# Patient Record
Sex: MALE | Race: WHITE | NOT HISPANIC OR LATINO | Employment: PART TIME | URBAN - METROPOLITAN AREA
[De-identification: names, ages, dates, MRNs, and addresses within clinical notes are randomized per-mention and may not be internally consistent; named-entity substitution may affect disease eponyms.]

---

## 2024-11-05 ENCOUNTER — HOSPITAL ENCOUNTER (INPATIENT)
Facility: HOSPITAL | Age: 74
LOS: 8 days | Discharge: HOME | DRG: 330 | End: 2024-11-13
Attending: SURGERY | Admitting: SURGERY
Payer: MEDICARE

## 2024-11-05 ENCOUNTER — ANESTHESIA EVENT (OUTPATIENT)
Dept: SURGERY | Facility: HOSPITAL | Age: 74
End: 2024-11-05
Payer: MEDICARE

## 2024-11-05 ENCOUNTER — ANESTHESIA (OUTPATIENT)
Dept: SURGERY | Facility: HOSPITAL | Age: 74
End: 2024-11-05
Payer: MEDICARE

## 2024-11-05 DIAGNOSIS — I49.9 ARRHYTHMIA: ICD-10-CM

## 2024-11-05 DIAGNOSIS — F17.200 SMOKER: ICD-10-CM

## 2024-11-05 DIAGNOSIS — I48.91 A-FIB: ICD-10-CM

## 2024-11-05 DIAGNOSIS — D49.0 NEOPLASM OF CECUM: Primary | ICD-10-CM

## 2024-11-05 DIAGNOSIS — I34.1 MVP (MITRAL VALVE PROLAPSE): ICD-10-CM

## 2024-11-05 DIAGNOSIS — I47.10 SUPRAVENTRICULAR TACHYCARDIA: ICD-10-CM

## 2024-11-05 DIAGNOSIS — Z01.818 PRE-OP TESTING: ICD-10-CM

## 2024-11-05 DIAGNOSIS — D49.0 NEOPLASM OF CECUM: ICD-10-CM

## 2024-11-05 DIAGNOSIS — I48.91 ATRIAL FIBRILLATION STATUS POST CARDIOVERSION: ICD-10-CM

## 2024-11-05 DIAGNOSIS — I49.9 IRREGULAR HEART RATE: ICD-10-CM

## 2024-11-05 DIAGNOSIS — K56.609 SMALL BOWEL OBSTRUCTION: ICD-10-CM

## 2024-11-05 DIAGNOSIS — I47.10 SVT (SUPRAVENTRICULAR TACHYCARDIA): ICD-10-CM

## 2024-11-05 PROCEDURE — 88307 TISSUE EXAM BY PATHOLOGIST: CPT | Mod: 26,,, | Performed by: PATHOLOGY

## 2024-11-05 PROCEDURE — 88307 TISSUE EXAM BY PATHOLOGIST: CPT | Performed by: PATHOLOGY

## 2024-11-05 PROCEDURE — 36000709 HC OR TIME LEV III EA ADD 15 MIN: Performed by: SURGERY

## 2024-11-05 PROCEDURE — 44160 REMOVAL OF COLON: CPT | Mod: ,,, | Performed by: SURGERY

## 2024-11-05 PROCEDURE — 36000708 HC OR TIME LEV III 1ST 15 MIN: Performed by: SURGERY

## 2024-11-05 PROCEDURE — 63600175 PHARM REV CODE 636 W HCPCS: Performed by: SURGERY

## 2024-11-05 PROCEDURE — 94761 N-INVAS EAR/PLS OXIMETRY MLT: CPT

## 2024-11-05 PROCEDURE — 71000039 HC RECOVERY, EACH ADD'L HOUR: Performed by: SURGERY

## 2024-11-05 PROCEDURE — 71000033 HC RECOVERY, INTIAL HOUR: Performed by: SURGERY

## 2024-11-05 PROCEDURE — 25000003 PHARM REV CODE 250: Performed by: NURSE ANESTHETIST, CERTIFIED REGISTERED

## 2024-11-05 PROCEDURE — 0DTF0ZZ RESECTION OF RIGHT LARGE INTESTINE, OPEN APPROACH: ICD-10-PCS | Performed by: SURGERY

## 2024-11-05 PROCEDURE — 63600175 PHARM REV CODE 636 W HCPCS: Performed by: NURSE ANESTHETIST, CERTIFIED REGISTERED

## 2024-11-05 PROCEDURE — 25000003 PHARM REV CODE 250: Performed by: SURGERY

## 2024-11-05 PROCEDURE — 88307 TISSUE EXAM BY PATHOLOGIST: CPT | Mod: 59 | Performed by: PATHOLOGY

## 2024-11-05 PROCEDURE — 27201423 OPTIME MED/SURG SUP & DEVICES STERILE SUPPLY: Performed by: SURGERY

## 2024-11-05 PROCEDURE — 11000001 HC ACUTE MED/SURG PRIVATE ROOM

## 2024-11-05 PROCEDURE — 37000009 HC ANESTHESIA EA ADD 15 MINS: Performed by: SURGERY

## 2024-11-05 PROCEDURE — 37000008 HC ANESTHESIA 1ST 15 MINUTES: Performed by: SURGERY

## 2024-11-05 PROCEDURE — 99900035 HC TECH TIME PER 15 MIN (STAT)

## 2024-11-05 PROCEDURE — 94760 N-INVAS EAR/PLS OXIMETRY 1: CPT

## 2024-11-05 PROCEDURE — 27000221 HC OXYGEN, UP TO 24 HOURS

## 2024-11-05 RX ORDER — BUPIVACAINE HYDROCHLORIDE AND EPINEPHRINE 5; 5 MG/ML; UG/ML
INJECTION, SOLUTION EPIDURAL; INTRACAUDAL; PERINEURAL
Status: DISCONTINUED | OUTPATIENT
Start: 2024-11-05 | End: 2024-11-05 | Stop reason: HOSPADM

## 2024-11-05 RX ORDER — ONDANSETRON HYDROCHLORIDE 2 MG/ML
4 INJECTION, SOLUTION INTRAVENOUS EVERY 12 HOURS PRN
Status: DISCONTINUED | OUTPATIENT
Start: 2024-11-05 | End: 2024-11-13 | Stop reason: HOSPADM

## 2024-11-05 RX ORDER — PHENYLEPHRINE HYDROCHLORIDE 10 MG/ML
INJECTION INTRAVENOUS
Status: DISCONTINUED | OUTPATIENT
Start: 2024-11-05 | End: 2024-11-05

## 2024-11-05 RX ORDER — TRAMADOL HYDROCHLORIDE 50 MG/1
50 TABLET ORAL EVERY 4 HOURS PRN
Status: DISCONTINUED | OUTPATIENT
Start: 2024-11-05 | End: 2024-11-09

## 2024-11-05 RX ORDER — PROPOFOL 10 MG/ML
VIAL (ML) INTRAVENOUS
Status: DISCONTINUED | OUTPATIENT
Start: 2024-11-05 | End: 2024-11-05

## 2024-11-05 RX ORDER — DEXAMETHASONE SODIUM PHOSPHATE 4 MG/ML
INJECTION, SOLUTION INTRA-ARTICULAR; INTRALESIONAL; INTRAMUSCULAR; INTRAVENOUS; SOFT TISSUE
Status: DISCONTINUED | OUTPATIENT
Start: 2024-11-05 | End: 2024-11-05

## 2024-11-05 RX ORDER — ONDANSETRON HYDROCHLORIDE 2 MG/ML
INJECTION, SOLUTION INTRAMUSCULAR; INTRAVENOUS
Status: DISCONTINUED | OUTPATIENT
Start: 2024-11-05 | End: 2024-11-05

## 2024-11-05 RX ORDER — SODIUM CHLORIDE 9 MG/ML
INJECTION, SOLUTION INTRAVENOUS CONTINUOUS
Status: DISCONTINUED | OUTPATIENT
Start: 2024-11-05 | End: 2024-11-07

## 2024-11-05 RX ORDER — FENTANYL CITRATE 50 UG/ML
INJECTION, SOLUTION INTRAMUSCULAR; INTRAVENOUS
Status: DISCONTINUED | OUTPATIENT
Start: 2024-11-05 | End: 2024-11-05

## 2024-11-05 RX ORDER — ROCURONIUM BROMIDE 10 MG/ML
INJECTION, SOLUTION INTRAVENOUS
Status: DISCONTINUED | OUTPATIENT
Start: 2024-11-05 | End: 2024-11-05

## 2024-11-05 RX ORDER — EPHEDRINE SULFATE 50 MG/ML
INJECTION, SOLUTION INTRAVENOUS
Status: DISCONTINUED | OUTPATIENT
Start: 2024-11-05 | End: 2024-11-05

## 2024-11-05 RX ORDER — CEFAZOLIN 2 G/1
INJECTION, POWDER, FOR SOLUTION INTRAMUSCULAR; INTRAVENOUS
Status: DISCONTINUED | OUTPATIENT
Start: 2024-11-05 | End: 2024-11-05

## 2024-11-05 RX ORDER — SUCCINYLCHOLINE CHLORIDE 20 MG/ML
INJECTION INTRAMUSCULAR; INTRAVENOUS
Status: DISCONTINUED | OUTPATIENT
Start: 2024-11-05 | End: 2024-11-05

## 2024-11-05 RX ORDER — LIDOCAINE HYDROCHLORIDE 20 MG/ML
INJECTION, SOLUTION EPIDURAL; INFILTRATION; INTRACAUDAL; PERINEURAL
Status: DISCONTINUED | OUTPATIENT
Start: 2024-11-05 | End: 2024-11-05

## 2024-11-05 RX ORDER — ACETAMINOPHEN 10 MG/ML
INJECTION, SOLUTION INTRAVENOUS
Status: DISCONTINUED | OUTPATIENT
Start: 2024-11-05 | End: 2024-11-05

## 2024-11-05 RX ORDER — HYDROMORPHONE HYDROCHLORIDE 1 MG/ML
1 INJECTION, SOLUTION INTRAMUSCULAR; INTRAVENOUS; SUBCUTANEOUS
Status: DISCONTINUED | OUTPATIENT
Start: 2024-11-05 | End: 2024-11-09

## 2024-11-05 RX ADMIN — ACETAMINOPHEN 1000 MG: 10 INJECTION, SOLUTION INTRAVENOUS at 12:11

## 2024-11-05 RX ADMIN — PHENYLEPHRINE HYDROCHLORIDE 100 MCG: 10 INJECTION INTRAVENOUS at 01:11

## 2024-11-05 RX ADMIN — FENTANYL CITRATE 50 MCG: 0.05 INJECTION, SOLUTION INTRAMUSCULAR; INTRAVENOUS at 12:11

## 2024-11-05 RX ADMIN — LIDOCAINE HYDROCHLORIDE 60 MG: 20 INJECTION, SOLUTION EPIDURAL; INFILTRATION; INTRACAUDAL; PERINEURAL at 12:11

## 2024-11-05 RX ADMIN — HYDROMORPHONE HYDROCHLORIDE 1 MG: 1 INJECTION, SOLUTION INTRAMUSCULAR; INTRAVENOUS; SUBCUTANEOUS at 09:11

## 2024-11-05 RX ADMIN — SUCCINYLCHOLINE CHLORIDE 140 MG: 20 INJECTION, SOLUTION INTRAMUSCULAR; INTRAVENOUS at 12:11

## 2024-11-05 RX ADMIN — ONDANSETRON 4 MG: 2 INJECTION INTRAMUSCULAR; INTRAVENOUS at 01:11

## 2024-11-05 RX ADMIN — ROCURONIUM BROMIDE 30 MG: 10 INJECTION, SOLUTION INTRAVENOUS at 12:11

## 2024-11-05 RX ADMIN — SUGAMMADEX 200 MG: 100 INJECTION, SOLUTION INTRAVENOUS at 01:11

## 2024-11-05 RX ADMIN — SODIUM CHLORIDE: 0.9 INJECTION, SOLUTION INTRAVENOUS at 01:11

## 2024-11-05 RX ADMIN — EPHEDRINE SULFATE 20 MG: 50 INJECTION, SOLUTION INTRAMUSCULAR; INTRAVENOUS; SUBCUTANEOUS at 01:11

## 2024-11-05 RX ADMIN — FENTANYL CITRATE 50 MCG: 0.05 INJECTION, SOLUTION INTRAMUSCULAR; INTRAVENOUS at 01:11

## 2024-11-05 RX ADMIN — HYDROMORPHONE HYDROCHLORIDE 1 MG: 1 INJECTION, SOLUTION INTRAMUSCULAR; INTRAVENOUS; SUBCUTANEOUS at 07:11

## 2024-11-05 RX ADMIN — DEXAMETHASONE SODIUM PHOSPHATE 4 MG: 4 INJECTION, SOLUTION INTRAMUSCULAR; INTRAVENOUS at 01:11

## 2024-11-05 RX ADMIN — ROCURONIUM BROMIDE 5 MG: 10 INJECTION, SOLUTION INTRAVENOUS at 12:11

## 2024-11-05 RX ADMIN — SODIUM CHLORIDE: 0.9 INJECTION, SOLUTION INTRAVENOUS at 12:11

## 2024-11-05 RX ADMIN — GLYCOPYRROLATE 0.2 MG: 0.2 INJECTION, SOLUTION INTRAMUSCULAR; INTRAVENOUS at 12:11

## 2024-11-05 RX ADMIN — EPHEDRINE SULFATE 20 MG: 50 INJECTION, SOLUTION INTRAMUSCULAR; INTRAVENOUS; SUBCUTANEOUS at 12:11

## 2024-11-05 RX ADMIN — ROCURONIUM BROMIDE 20 MG: 10 INJECTION, SOLUTION INTRAVENOUS at 01:11

## 2024-11-05 RX ADMIN — PROPOFOL 130 MG: 10 INJECTION, EMULSION INTRAVENOUS at 12:11

## 2024-11-05 RX ADMIN — SODIUM CHLORIDE: 9 INJECTION, SOLUTION INTRAVENOUS at 03:11

## 2024-11-05 RX ADMIN — EPHEDRINE SULFATE 10 MG: 50 INJECTION, SOLUTION INTRAMUSCULAR; INTRAVENOUS; SUBCUTANEOUS at 01:11

## 2024-11-05 RX ADMIN — CEFAZOLIN 2 G: 2 INJECTION, POWDER, FOR SOLUTION INTRAMUSCULAR; INTRAVENOUS at 12:11

## 2024-11-05 RX ADMIN — HYDROMORPHONE HYDROCHLORIDE 1 MG: 1 INJECTION, SOLUTION INTRAMUSCULAR; INTRAVENOUS; SUBCUTANEOUS at 04:11

## 2024-11-05 NOTE — PROGRESS NOTES
Pt meets all criteria to be transferred to [facility]. [facility] nurse not ready for pt yet. Nurse will call when ready. Pt is resting comfortably with some mild pain. NG tube secured. Pt on 2L NC with sats in mid 90s. Dressing WNL with small spot of blood.

## 2024-11-05 NOTE — ANESTHESIA PREPROCEDURE EVALUATION
11/05/2024  Abdifatah Steele is a 74 y.o., male.    Past Medical History:   Diagnosis Date    Fractures      Past Surgical History:   Procedure Laterality Date    HAND SURGERY      SKULL FRACTURE ELEVATION         Pre-op Assessment    I have reviewed the Patient Summary Reports.     I have reviewed the Nursing Notes. I have reviewed the NPO Status.   I have reviewed the Medications.     Review of Systems  Anesthesia Hx:  No problems with previous Anesthesia                Social:  Smoker, No Alcohol Use       Hematology/Oncology:  Hematology Normal   Oncology Normal                                   EENT/Dental:  EENT/Dental Normal           Cardiovascular:  Cardiovascular Normal Exercise tolerance: good                                             Pulmonary:  Pulmonary Normal                       Renal/:  Renal/ Normal                 Hepatic/GI:  Hepatic/GI Normal                    Musculoskeletal:  Musculoskeletal Normal                Neurological:  Neurology Normal                                      Endocrine:  Endocrine Normal            Dermatological:  Skin Normal    Psych:  Psychiatric Normal                  Physical Exam  General: Well nourished    Airway:  Mallampati: II   Mouth Opening: Normal  TM Distance: Normal  Tongue: Normal  Neck ROM: Normal ROM    Chest/Lungs:  Clear to auscultation    Heart:  Rate: Normal  Rhythm: Regular Rhythm  Sounds: Normal      Anesthesia Plan  Type of Anesthesia, risks & benefits discussed:    Anesthesia Type: Gen ETT  Intra-op Monitoring Plan: Standard ASA Monitors  Post Op Pain Control Plan: multimodal analgesia  Induction:  IV  Airway Plan: Direct  Informed Consent: Informed consent signed with the Patient and all parties understand the risks and agree with anesthesia plan.  All questions answered.   ASA Score: 3  Day of Surgery Review of History &  Physical: H&P Update referred to the surgeon/provider.I have interviewed and examined the patient. I have reviewed the patient's H&P dated: 11/5/24. There are no significant changes.     Ready For Surgery From Anesthesia Perspective.     .

## 2024-11-05 NOTE — ANESTHESIA PROCEDURE NOTES
Intubation    Date/Time: 11/5/2024 12:39 PM    Performed by: Naldo Sapp CRNA  Authorized by: Naldo Sapp CRNA    Intubation:     Induction:  Rapid sequence induction    Intubated:  Postinduction    Mask Ventilation:  N/a    Attempts:  1    Attempted By:  Student    Method of Intubation:  Video laryngoscopy    Blade:  Baker 3    Laryngeal View Grade: Grade I - full view of cords      Difficult Airway Encountered?: No      Complications:  None    Airway Device:  Oral endotracheal tube    Airway Device Size:  7.5    Style/Cuff Inflation:  Cuffed (inflated to minimal occlusive pressure)    Tube secured:  22    Secured at:  The lips    Placement Verified By:  Capnometry    Complicating Factors:  None    Findings Post-Intubation:  BS equal bilateral and atraumatic/condition of teeth unchanged

## 2024-11-05 NOTE — TRANSFER OF CARE
Anesthesia Transfer of Care Note    Patient: Abdifatah Steele    Procedure(s) Performed: Procedure(s) (LRB):  LAPAROSCOPY, DIAGNOSTIC (N/A)  LAPAROTOMY, EXPLORATORY (N/A)  COLON RESECTION (Right)    Patient location: PACU    Anesthesia Type: general    Transport from OR: Transported from OR on 6-10 L/min O2 by face mask with adequate spontaneous ventilation    Post pain: adequate analgesia    Post assessment: no apparent anesthetic complications and tolerated procedure well    Post vital signs: stable    Level of consciousness: sedated    Nausea/Vomiting: no nausea/vomiting    Complications: none    Transfer of care protocol was followed      Last vitals: Visit Vitals  BP (!) 113/57   Pulse 71   Temp 36.3 °C (97.4 °F)   Resp 15   SpO2 (!) 91%

## 2024-11-05 NOTE — OP NOTE
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  All questions answered.  Patient brought to the operating room placed on a standard operating room table in supine position.  A time-out was performed.  General anesthesia was established.  Prophylactic antibiotics were administered.  His arms were tucked.  A Manning catheter was not placed.  His abdomen was clipped prepped and draped in standard surgical fashion.  Local anesthetic was injected prior to any skin incision.  A Veress needle was passed through the center of the umbilicus aspirated and flushed saline.  Saline drop test was positive.  Pneumoperitoneum was established to 15 mmHg and then a 5 mm trocar was then placed in the abdomen.  Anesthesia immediately told me to let out the pneumoperitoneum as he was becoming severely bradycardic.  Once I let out the gas they gave him some Robinul and his heart rate came back up to normal.  They told me I could try again with the pneumoperitoneum and as soon as I started inflating again he became bradycardic again.  I let out the air and his heart rate returned to normal.  At this point because he was fairly distended, I figured I would have to open anyway so we just proceeded with open laparotomy and aborted the laparoscopic approach.  I injected local under the  skin and then made a longitudinal midline laparotomy incision.  Cautery was used to get through subcutaneous tissue through the linea alba and into the abdomen.  Upon entering the abdomen I eviscerated the dilated small bowel and could immediately feel a mass.  Once all the small bowel was eviscerated I could tell that the mass was right at the ileocecal valve inside the cecum and this is what was causing his small bowel obstruction.  I felt his liver which appeared smooth and I do not feel any masses.  I do not see any evidence of distant disease or metastatic disease.  I then had anesthesia pass a nasogastric tube I could feel the tube in the stomach.  I then proceeded with open right hemicolectomy.  Patient was thin had very easy anatomy to see.  I mobilized the white line of Toldt and medialized and mobilized the cecum as well as the ascending colon and the hepatic flexure very easily.  This was done with cautery and blunt dissection.  I then picked out a spot of the terminal ileum made a window in the mesentery with cautery and then used the 75 mm MITCH stapler to staple across the terminal ileum.  I then picked a spot in the mid ascending colon near the hepatic flexure that was mobilized and used the 75 mm stapler MITCH to staple across the colon.  The only remaining structure was the mesentery and the ileocolic vessels which I took down with the hand-held Doyen device.  The main ileocolic vessels were ligated at its origin and this was doubly ligated with 0 silk suture ties.  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  There were no bulky lymph nodes felt in the mesentery.  I then proceeded to do my ileocolic anastomosis side-to-side functional end to end by stapling the common hole open with the 75 mm MITCH stapler and then the common hole was closed with a 60 TA blue load stapler.  I then milked the small bowel contents through  the anastomosis into the colon.  There was no evidence of any leakage at the anastomosis and the anastomosis was patent.  I then closed the mesenteric defect with 2-0 Vicryl suture.  Good hemostasis was obtained.  I then placed the small bowel contents back into the abdomen and covered the small bowel with the greater omentum.  The midline fascia was closed with a running 0 PDS x2.  Skin was closed with skin staples and then Aquacel Ag surgical dressing was applied over the midline incision.  The specimen will be sent to pathology.    Significant Surgical Tasks Conducted by the Assistant(s), if Applicable:     Estimated Blood Loss (EBL):  25 mL           Implants:     Specimens:  Right colon  Specimen (24h ago, onward)       Start     Ordered    11/05/24 1337  Specimen to Pathology, Surgery General Surgery  Once        Comments: Pre-op Diagnosis: Small bowel obstruction [K56.609]Procedure(s):LAPAROSCOPY, DIAGNOSTICLAPAROTOMY, EXPLORATORYCOLON RESECTION Number of specimens: 1Name of specimens: right colon anastomosisDrMillie Lopez     References:    Click here for ordering Quick Tip   Question Answer Comment   Procedure Type: General Surgery    Release to patient Immediate        11/05/24 1339    11/05/24 1331  Specimen to Pathology, Surgery General Surgery  Once        Comments: Pre-op Diagnosis: Small bowel obstruction [K56.609]Procedure(s):LAPAROSCOPY, DIAGNOSTICLAPAROTOMY, EXPLORATORYCOLON RESECTION Number of specimens: 1Name of specimens: right colonDrMillie Lopez     References:    Click here for ordering Quick Tip   Question Answer Comment   Procedure Type: General Surgery    Release to patient Immediate        11/05/24 1331                            Condition: Good    Disposition: PACU - hemodynamically stable.    Attestation: I performed the procedure.

## 2024-11-05 NOTE — NURSING
Pt here from recovery via bed by MILI Stallings. Pt AAO X 4.  No SOB or acute distress noted. Pt on 3L-N. Pt has fluids to gravity. Patient resting comfortably in bed. Fall/Safety maintained, bed low, locked, side rails up x 2, call bell within reach. APt with slip resistant socks on; remains free of fall or injury.

## 2024-11-05 NOTE — ANESTHESIA POSTPROCEDURE EVALUATION
Anesthesia Post Evaluation    Patient: Abdifatah Steele    Procedure(s) Performed: Procedure(s) (LRB):  LAPAROSCOPY, DIAGNOSTIC (N/A)  LAPAROTOMY, EXPLORATORY (N/A)  COLON RESECTION (Right)    Final Anesthesia Type: general      Patient location during evaluation: PACU  Patient participation: Yes- Able to Participate  Level of consciousness: awake and alert, oriented and awake  Post-procedure vital signs: reviewed and stable  Pain management: adequate  Airway patency: patent    PONV status at discharge: No PONV  Anesthetic complications: no      Cardiovascular status: blood pressure returned to baseline, hemodynamically stable and stable  Respiratory status: unassisted, spontaneous ventilation and room air  Hydration status: euvolemic  Follow-up not needed.              Vitals Value Taken Time   /57 11/05/24 1430   Temp 36.3 °C (97.4 °F) 11/05/24 1410   Pulse 71 11/05/24 1430   Resp 15 11/05/24 1430   SpO2 91 % 11/05/24 1430         No case tracking events are documented in the log.      Pain/Celena Score: Celena Score: 9 (11/5/2024  2:35 PM)

## 2024-11-05 NOTE — BRIEF OP NOTE
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         Specimens:  Right colon  Specimen (24h ago, onward)       Start     Ordered    11/05/24 1337  Specimen to Pathology, Surgery General Surgery  Once        Comments: Pre-op Diagnosis: Small bowel obstruction [K56.609]Procedure(s):LAPAROSCOPY, DIAGNOSTICLAPAROTOMY, EXPLORATORYCOLON RESECTION Number of specimens: 1Name of specimens: right colon anastomosisDrMillie Lopez     References:    Click here for ordering Quick Tip   Question Answer Comment   Procedure Type: General Surgery    Release to patient Immediate        11/05/24 1339    11/05/24 1331  Specimen to Pathology, Surgery General Surgery  Once        Comments: Pre-op Diagnosis: Small bowel obstruction [K56.609]Procedure(s):LAPAROSCOPY, DIAGNOSTICLAPAROTOMY, EXPLORATORYCOLON RESECTION Number of specimens: 1Name of specimens: right colonDr. Jessica     References:    Click here for ordering Quick Tip   Question Answer Comment   Procedure Type: General Surgery    Release to patient Immediate        11/05/24 1331                    [id number]

## 2024-11-06 LAB
ANION GAP SERPL CALC-SCNC: 11 MMOL/L (ref 8–16)
BUN SERPL-MCNC: 10 MG/DL (ref 8–23)
CALCIUM SERPL-MCNC: 7.9 MG/DL (ref 8.7–10.5)
CHLORIDE SERPL-SCNC: 112 MMOL/L (ref 95–110)
CO2 SERPL-SCNC: 21 MMOL/L (ref 23–29)
CREAT SERPL-MCNC: 0.8 MG/DL (ref 0.5–1.4)
ERYTHROCYTE [DISTWIDTH] IN BLOOD BY AUTOMATED COUNT: 14.3 % (ref 11.5–14.5)
EST. GFR  (NO RACE VARIABLE): >60 ML/MIN/1.73 M^2
GLUCOSE SERPL-MCNC: 90 MG/DL (ref 70–110)
HCT VFR BLD AUTO: 42 % (ref 40–54)
HGB BLD-MCNC: 13.7 G/DL (ref 14–18)
MAGNESIUM SERPL-MCNC: 1.8 MG/DL (ref 1.6–2.6)
MCH RBC QN AUTO: 32.2 PG (ref 27–31)
MCHC RBC AUTO-ENTMCNC: 32.6 G/DL (ref 32–36)
MCV RBC AUTO: 99 FL (ref 82–98)
OHS QRS DURATION: 126 MS
OHS QTC CALCULATION: 451 MS
PLATELET # BLD AUTO: 249 K/UL (ref 150–450)
PMV BLD AUTO: 9.5 FL (ref 9.2–12.9)
POTASSIUM SERPL-SCNC: 4.1 MMOL/L (ref 3.5–5.1)
RBC # BLD AUTO: 4.25 M/UL (ref 4.6–6.2)
SODIUM SERPL-SCNC: 144 MMOL/L (ref 136–145)
WBC # BLD AUTO: 10.42 K/UL (ref 3.9–12.7)

## 2024-11-06 PROCEDURE — 21400001 HC TELEMETRY ROOM

## 2024-11-06 PROCEDURE — 93010 ELECTROCARDIOGRAM REPORT: CPT | Mod: ,,, | Performed by: INTERNAL MEDICINE

## 2024-11-06 PROCEDURE — 63600175 PHARM REV CODE 636 W HCPCS: Performed by: SURGERY

## 2024-11-06 PROCEDURE — 85027 COMPLETE CBC AUTOMATED: CPT | Performed by: SURGERY

## 2024-11-06 PROCEDURE — 94799 UNLISTED PULMONARY SVC/PX: CPT

## 2024-11-06 PROCEDURE — 83735 ASSAY OF MAGNESIUM: CPT | Performed by: FAMILY MEDICINE

## 2024-11-06 PROCEDURE — 27000221 HC OXYGEN, UP TO 24 HOURS

## 2024-11-06 PROCEDURE — 25000003 PHARM REV CODE 250: Performed by: SURGERY

## 2024-11-06 PROCEDURE — 99900035 HC TECH TIME PER 15 MIN (STAT)

## 2024-11-06 PROCEDURE — 80048 BASIC METABOLIC PNL TOTAL CA: CPT | Performed by: SURGERY

## 2024-11-06 PROCEDURE — 63600175 PHARM REV CODE 636 W HCPCS: Performed by: FAMILY MEDICINE

## 2024-11-06 PROCEDURE — 94761 N-INVAS EAR/PLS OXIMETRY MLT: CPT

## 2024-11-06 PROCEDURE — 99221 1ST HOSP IP/OBS SF/LOW 40: CPT | Mod: ,,, | Performed by: FAMILY MEDICINE

## 2024-11-06 PROCEDURE — 36415 COLL VENOUS BLD VENIPUNCTURE: CPT | Performed by: FAMILY MEDICINE

## 2024-11-06 PROCEDURE — 36415 COLL VENOUS BLD VENIPUNCTURE: CPT | Performed by: SURGERY

## 2024-11-06 PROCEDURE — 93005 ELECTROCARDIOGRAM TRACING: CPT

## 2024-11-06 RX ORDER — MAGNESIUM SULFATE HEPTAHYDRATE 40 MG/ML
2 INJECTION, SOLUTION INTRAVENOUS ONCE
Status: COMPLETED | OUTPATIENT
Start: 2024-11-06 | End: 2024-11-06

## 2024-11-06 RX ORDER — DEXTROSE MONOHYDRATE, SODIUM CHLORIDE, AND POTASSIUM CHLORIDE 50; 1.49; 4.5 G/1000ML; G/1000ML; G/1000ML
INJECTION, SOLUTION INTRAVENOUS CONTINUOUS
Status: DISCONTINUED | OUTPATIENT
Start: 2024-11-06 | End: 2024-11-08

## 2024-11-06 RX ADMIN — MAGNESIUM SULFATE HEPTAHYDRATE 2 G: 40 INJECTION, SOLUTION INTRAVENOUS at 06:11

## 2024-11-06 RX ADMIN — SODIUM CHLORIDE: 9 INJECTION, SOLUTION INTRAVENOUS at 09:11

## 2024-11-06 RX ADMIN — HYDROMORPHONE HYDROCHLORIDE 1 MG: 1 INJECTION, SOLUTION INTRAMUSCULAR; INTRAVENOUS; SUBCUTANEOUS at 10:11

## 2024-11-06 RX ADMIN — HYDROMORPHONE HYDROCHLORIDE 1 MG: 1 INJECTION, SOLUTION INTRAMUSCULAR; INTRAVENOUS; SUBCUTANEOUS at 02:11

## 2024-11-06 RX ADMIN — HYDROMORPHONE HYDROCHLORIDE 1 MG: 1 INJECTION, SOLUTION INTRAMUSCULAR; INTRAVENOUS; SUBCUTANEOUS at 09:11

## 2024-11-06 RX ADMIN — HYDROMORPHONE HYDROCHLORIDE 1 MG: 1 INJECTION, SOLUTION INTRAMUSCULAR; INTRAVENOUS; SUBCUTANEOUS at 07:11

## 2024-11-06 RX ADMIN — DEXTROSE, SODIUM CHLORIDE, AND POTASSIUM CHLORIDE: 5; .45; .15 INJECTION INTRAVENOUS at 02:11

## 2024-11-06 RX ADMIN — HYDROMORPHONE HYDROCHLORIDE 1 MG: 1 INJECTION, SOLUTION INTRAMUSCULAR; INTRAVENOUS; SUBCUTANEOUS at 05:11

## 2024-11-06 RX ADMIN — HYDROMORPHONE HYDROCHLORIDE 1 MG: 1 INJECTION, SOLUTION INTRAMUSCULAR; INTRAVENOUS; SUBCUTANEOUS at 04:11

## 2024-11-06 RX ADMIN — HYDROMORPHONE HYDROCHLORIDE 1 MG: 1 INJECTION, SOLUTION INTRAMUSCULAR; INTRAVENOUS; SUBCUTANEOUS at 12:11

## 2024-11-06 RX ADMIN — HYDROMORPHONE HYDROCHLORIDE 1 MG: 1 INJECTION, SOLUTION INTRAMUSCULAR; INTRAVENOUS; SUBCUTANEOUS at 03:11

## 2024-11-06 NOTE — PLAN OF CARE
11/06/24 1013   Rounds   Attendance Provider;Nurse    Discharge Plan A Home   Why the patient remains in the hospital Requires continued medical care   Transition of Care Barriers None     Care team at bedside, discussed plan of care with patient and family.  Discharge planning initiated. Patient provided with Case Management contact information and encouraged to call with concerns or questions. Discharge Planning Begins on Admission Pamphlet provided.  Will continue to follow for duration of stay.

## 2024-11-06 NOTE — PLAN OF CARE
[place] - Med Surg (3rd Fl)  Discharge Assessment    Primary Care Provider: Mychal Valderrama MD     Discharge Assessment (most recent)       BRIEF DISCHARGE ASSESSMENT - 11/06/24 1144          Discharge Planning    Assessment Type Discharge Planning Brief Assessment (P)      Resource/Environmental Concerns none (P)      Support Systems Spouse/significant other;Family members;Friends/neighbors (P)      Assistance Needed none (P)      Equipment Currently Used at Home none (P)      Current Living Arrangements home (P)      Patient/Family Anticipates Transition to home (P)      Patient/Family Anticipated Services at Transition none (P)      DME Needed Upon Discharge  none (P)      Discharge Plan A Home (P)      Discharge Plan B Home with family (P)         Physical Activity    On average, how many days per week do you engage in moderate to strenuous exercise (like a brisk walk)? 0 days (P)      On average, how many minutes do you engage in exercise at this level? 0 min (P)         Social Isolation    How often do you feel lonely or isolated from those around you?  Never (P)         Alcohol Use    Q1: How often do you have a drink containing alcohol? Never (P)      Q2: How many drinks containing alcohol do you have on a typical day when you are drinking? Patient does not drink (P)      Q3: How often do you have six or more drinks on one occasion? Never (P)                      Discharge assessment completed at bedside with patient. No post acute needs determined at this time. CM to remain available if needs arise.

## 2024-11-06 NOTE — PLAN OF CARE
Problem: Adult Inpatient Plan of Care  Goal: Plan of Care Review  Outcome: Progressing  Flowsheets (Taken 11/6/2024 6172)  Plan of Care Reviewed With:   patient   spouse  Goal: Patient-Specific Goal (Individualized)  Outcome: Progressing  Goal: Absence of Hospital-Acquired Illness or Injury  Outcome: Progressing  Goal: Optimal Comfort and Wellbeing  Outcome: Progressing  Goal: Readiness for Transition of Care  Outcome: Progressing     Problem: Wound  Goal: Optimal Coping  Outcome: Progressing  Goal: Optimal Functional Ability  Outcome: Progressing  Goal: Absence of Infection Signs and Symptoms  Outcome: Progressing  Goal: Improved Oral Intake  Outcome: Progressing  Goal: Optimal Pain Control and Function  Outcome: Progressing  Goal: Skin Health and Integrity  Outcome: Progressing  Goal: Optimal Wound Healing  Outcome: Progressing     Problem: Fall Injury Risk  Goal: Absence of Fall and Fall-Related Injury  Outcome: Progressing     Problem: Pain Acute  Goal: Optimal Pain Control and Function  Outcome: Progressing

## 2024-11-06 NOTE — PROGRESS NOTES
SUBJECTIVE:  Pt seen and examined.  Underwent open Rt hemicolectomy yesterday and is recovering nicely.  Manning removed and is able to urinate.  Denies nausea, flatus, BM and is c/o soar throat from NGT.        OBJECTIVE:  Temp:  [97.4 °F (36.3 °C)-98.7 °F (37.1 °C)]   Pulse:  [62-86]   Resp:  [14-20]   BP: (110-145)/(57-65)   SpO2:  [91 %-97 %]       NAD, A&Ox3  RRR  CTAb  Soft, ND, approp TTP, absent BS, incision c/d/I, no SSi, NGT in place with scant output  No C/C/E, MAEW    I & O (Last 24H):  Intake/Output Summary (Last 24 hours) at 11/6/2024 1304  Last data filed at 11/6/2024 1030  Gross per 24 hour   Intake 940.72 ml   Output 425 ml   Net 515.72 ml       Lab Results   Component Value Date    WBC 10.42 11/06/2024    HGB 13.7 (L) 11/06/2024    HCT 42.0 11/06/2024    MCV 99 (H) 11/06/2024     11/06/2024     Lab Results   Component Value Date    CREATININE 0.8 11/06/2024    BUN 10 11/06/2024     11/06/2024    K 4.1 11/06/2024     (H) 11/06/2024    CO2 21 (L) 11/06/2024    CALCIUM 7.9 (L) 11/06/2024    PHOS 3.0 09/20/2012    MG 1.8 11/06/2024       ASSESSMENT/PLAN:   Abdifatah Steele is a 74 y.o. male POD 1 Rt hemicolectomy    1.  Will add chloraseptic lozenges  2. MIVF  3.  Encouraged ambulation and IS use

## 2024-11-07 PROBLEM — I47.10 SUPRAVENTRICULAR TACHYCARDIA: Status: ACTIVE | Noted: 2024-11-07

## 2024-11-07 LAB
ALBUMIN SERPL BCP-MCNC: 2.7 G/DL (ref 3.5–5.2)
ALP SERPL-CCNC: 56 U/L (ref 40–150)
ALT SERPL W/O P-5'-P-CCNC: 13 U/L (ref 10–44)
ANION GAP SERPL CALC-SCNC: 5 MMOL/L (ref 8–16)
AST SERPL-CCNC: 14 U/L (ref 10–40)
BASOPHILS # BLD AUTO: 0.01 K/UL (ref 0–0.2)
BASOPHILS NFR BLD: 0.1 % (ref 0–1.9)
BILIRUB SERPL-MCNC: 0.4 MG/DL (ref 0.1–1)
BUN SERPL-MCNC: 8 MG/DL (ref 8–23)
CALCIUM SERPL-MCNC: 8.3 MG/DL (ref 8.7–10.5)
CHLORIDE SERPL-SCNC: 106 MMOL/L (ref 95–110)
CO2 SERPL-SCNC: 28 MMOL/L (ref 23–29)
CREAT SERPL-MCNC: 0.7 MG/DL (ref 0.5–1.4)
DIFFERENTIAL METHOD BLD: ABNORMAL
EOSINOPHIL # BLD AUTO: 0 K/UL (ref 0–0.5)
EOSINOPHIL NFR BLD: 0.4 % (ref 0–8)
ERYTHROCYTE [DISTWIDTH] IN BLOOD BY AUTOMATED COUNT: 14 % (ref 11.5–14.5)
EST. GFR  (NO RACE VARIABLE): >60 ML/MIN/1.73 M^2
GLUCOSE SERPL-MCNC: 126 MG/DL (ref 70–110)
HCT VFR BLD AUTO: 41 % (ref 40–54)
HGB BLD-MCNC: 13.5 G/DL (ref 14–18)
IMM GRANULOCYTES # BLD AUTO: 0.02 K/UL (ref 0–0.04)
IMM GRANULOCYTES NFR BLD AUTO: 0.3 % (ref 0–0.5)
LYMPHOCYTES # BLD AUTO: 0.6 K/UL (ref 1–4.8)
LYMPHOCYTES NFR BLD: 8.2 % (ref 18–48)
MAGNESIUM SERPL-MCNC: 2.3 MG/DL (ref 1.6–2.6)
MCH RBC QN AUTO: 32.4 PG (ref 27–31)
MCHC RBC AUTO-ENTMCNC: 32.9 G/DL (ref 32–36)
MCV RBC AUTO: 98 FL (ref 82–98)
MONOCYTES # BLD AUTO: 0.7 K/UL (ref 0.3–1)
MONOCYTES NFR BLD: 8.9 % (ref 4–15)
NEUTROPHILS # BLD AUTO: 6.2 K/UL (ref 1.8–7.7)
NEUTROPHILS NFR BLD: 82.1 % (ref 38–73)
NRBC BLD-RTO: 0 /100 WBC
PLATELET # BLD AUTO: 242 K/UL (ref 150–450)
PMV BLD AUTO: 9.8 FL (ref 9.2–12.9)
POTASSIUM SERPL-SCNC: 3.9 MMOL/L (ref 3.5–5.1)
PROT SERPL-MCNC: 5.7 G/DL (ref 6–8.4)
RBC # BLD AUTO: 4.17 M/UL (ref 4.6–6.2)
SODIUM SERPL-SCNC: 139 MMOL/L (ref 136–145)
WBC # BLD AUTO: 7.57 K/UL (ref 3.9–12.7)

## 2024-11-07 PROCEDURE — 63600175 PHARM REV CODE 636 W HCPCS: Performed by: SURGERY

## 2024-11-07 PROCEDURE — 85025 COMPLETE CBC W/AUTO DIFF WBC: CPT | Performed by: PHYSICIAN ASSISTANT

## 2024-11-07 PROCEDURE — 27000221 HC OXYGEN, UP TO 24 HOURS

## 2024-11-07 PROCEDURE — 94761 N-INVAS EAR/PLS OXIMETRY MLT: CPT

## 2024-11-07 PROCEDURE — 83735 ASSAY OF MAGNESIUM: CPT | Performed by: PHYSICIAN ASSISTANT

## 2024-11-07 PROCEDURE — 80053 COMPREHEN METABOLIC PANEL: CPT | Performed by: PHYSICIAN ASSISTANT

## 2024-11-07 PROCEDURE — 99900035 HC TECH TIME PER 15 MIN (STAT)

## 2024-11-07 PROCEDURE — 36415 COLL VENOUS BLD VENIPUNCTURE: CPT | Performed by: PHYSICIAN ASSISTANT

## 2024-11-07 PROCEDURE — 21400001 HC TELEMETRY ROOM

## 2024-11-07 PROCEDURE — 99223 1ST HOSP IP/OBS HIGH 75: CPT | Mod: ,,, | Performed by: NURSE PRACTITIONER

## 2024-11-07 RX ORDER — ACETAMINOPHEN 10 MG/ML
1000 INJECTION, SOLUTION INTRAVENOUS EVERY 8 HOURS
Status: COMPLETED | OUTPATIENT
Start: 2024-11-07 | End: 2024-11-08

## 2024-11-07 RX ORDER — PANTOPRAZOLE SODIUM 40 MG/10ML
40 INJECTION, POWDER, LYOPHILIZED, FOR SOLUTION INTRAVENOUS DAILY
Status: DISCONTINUED | OUTPATIENT
Start: 2024-11-07 | End: 2024-11-09

## 2024-11-07 RX ORDER — ENOXAPARIN SODIUM 100 MG/ML
40 INJECTION SUBCUTANEOUS EVERY 24 HOURS
Status: DISCONTINUED | OUTPATIENT
Start: 2024-11-07 | End: 2024-11-09

## 2024-11-07 RX ORDER — IBUPROFEN 200 MG
1 TABLET ORAL DAILY PRN
Status: DISCONTINUED | OUTPATIENT
Start: 2024-11-07 | End: 2024-11-13 | Stop reason: HOSPADM

## 2024-11-07 RX ADMIN — HYDROMORPHONE HYDROCHLORIDE 1 MG: 1 INJECTION, SOLUTION INTRAMUSCULAR; INTRAVENOUS; SUBCUTANEOUS at 07:11

## 2024-11-07 RX ADMIN — HYDROMORPHONE HYDROCHLORIDE 1 MG: 1 INJECTION, SOLUTION INTRAMUSCULAR; INTRAVENOUS; SUBCUTANEOUS at 12:11

## 2024-11-07 RX ADMIN — HYDROMORPHONE HYDROCHLORIDE 1 MG: 1 INJECTION, SOLUTION INTRAMUSCULAR; INTRAVENOUS; SUBCUTANEOUS at 08:11

## 2024-11-07 RX ADMIN — ACETAMINOPHEN 1000 MG: 10 INJECTION, SOLUTION INTRAVENOUS at 01:11

## 2024-11-07 RX ADMIN — PANTOPRAZOLE SODIUM 40 MG: 40 INJECTION, POWDER, FOR SOLUTION INTRAVENOUS at 09:11

## 2024-11-07 RX ADMIN — HYDROMORPHONE HYDROCHLORIDE 1 MG: 1 INJECTION, SOLUTION INTRAMUSCULAR; INTRAVENOUS; SUBCUTANEOUS at 06:11

## 2024-11-07 RX ADMIN — HYDROMORPHONE HYDROCHLORIDE 1 MG: 1 INJECTION, SOLUTION INTRAMUSCULAR; INTRAVENOUS; SUBCUTANEOUS at 10:11

## 2024-11-07 RX ADMIN — ENOXAPARIN SODIUM 40 MG: 40 INJECTION SUBCUTANEOUS at 04:11

## 2024-11-07 RX ADMIN — HYDROMORPHONE HYDROCHLORIDE 1 MG: 1 INJECTION, SOLUTION INTRAMUSCULAR; INTRAVENOUS; SUBCUTANEOUS at 05:11

## 2024-11-07 RX ADMIN — DEXTROSE, SODIUM CHLORIDE, AND POTASSIUM CHLORIDE: 5; .45; .15 INJECTION INTRAVENOUS at 05:11

## 2024-11-07 RX ADMIN — DEXTROSE, SODIUM CHLORIDE, AND POTASSIUM CHLORIDE: 5; .45; .15 INJECTION INTRAVENOUS at 03:11

## 2024-11-07 RX ADMIN — HYDROMORPHONE HYDROCHLORIDE 1 MG: 1 INJECTION, SOLUTION INTRAMUSCULAR; INTRAVENOUS; SUBCUTANEOUS at 03:11

## 2024-11-07 RX ADMIN — ACETAMINOPHEN 1000 MG: 10 INJECTION, SOLUTION INTRAVENOUS at 11:11

## 2024-11-07 NOTE — PROGRESS NOTES
[facility] Surg (3rd Fl)  General Surgery  Progress Note    Subjective:     History of Present Illness:  Right colon mass    Post-Op Info:  Procedure(s) (LRB):  LAPAROSCOPY, DIAGNOSTIC (N/A)  LAPAROTOMY, EXPLORATORY (N/A)  COLON RESECTION (Right)   2 Days Post-Op     Interval History: Patient doing well, up out of bed, abdomen rumbling, NG tube with nothing.     Medications:  Continuous Infusions:   0.9% NaCl   Intravenous Continuous 75 mL/hr at 11/06/24 0905 New Bag at 11/06/24 0905    dextrose 5 % and 0.45 % NaCl with KCl 20 mEq   Intravenous Continuous 75 mL/hr at 11/07/24 0338 New Bag at 11/07/24 0338     Scheduled Meds:  PRN Meds:  Current Facility-Administered Medications:     HYDROmorphone, 1 mg, Intravenous, Q2H PRN    ondansetron, 4 mg, Intravenous, Q12H PRN    traMADoL, 50 mg, Oral, Q4H PRN     Review of patient's allergies indicates:  No Known Allergies  Objective:     Vital Signs (Most Recent):  Temp: 98.5 °F (36.9 °C) (11/07/24 0733)  Pulse: 74 (11/07/24 0800)  Resp: 18 (11/07/24 0834)  BP: 131/60 (11/07/24 0733)  SpO2: 96 % (11/07/24 0733) Vital Signs (24h Range):  Temp:  [97.6 °F (36.4 °C)-98.5 °F (36.9 °C)] 98.5 °F (36.9 °C)  Pulse:  [] 74  Resp:  [14-20] 18  SpO2:  [94 %-96 %] 96 %  BP: (118-133)/(57-66) 131/60     Weight: 74 kg (163 lb 2.3 oz)  Body mass index is 23.41 kg/m².    Intake/Output - Last 3 Shifts         11/05 0700 11/06 0659 11/06 0700 11/07 0659 11/07 0700 11/08 0659    P.O. 0      I.V. (mL/kg) 940.7 (12.7)      IV Piggyback 100      Total Intake(mL/kg) 1040.7 (14.1)      Urine (mL/kg/hr) 300 400 (0.2)     Drains 25      Total Output 325 400     Net +715.7 -400                     Physical Exam  Vitals and nursing note reviewed.   Constitutional:       Appearance: Normal appearance. He is well-developed.   Cardiovascular:      Rate and Rhythm: Normal rate and regular rhythm.   Pulmonary:      Effort: Pulmonary effort is normal.      Breath sounds: Normal breath sounds.    Abdominal:      General: Bowel sounds are normal. There is distension.      Palpations: Abdomen is soft.      Tenderness: There is abdominal tenderness.   Musculoskeletal:      Cervical back: Normal range of motion and neck supple.   Skin:     General: Skin is warm and dry.          Significant Labs:  I have reviewed all pertinent lab results within the past 24 hours.  CBC:   Recent Labs   Lab 11/06/24  0558   WBC 10.42   RBC 4.25*   HGB 13.7*   HCT 42.0      MCV 99*   MCH 32.2*   MCHC 32.6     BMP:   Recent Labs   Lab 11/06/24  0558   GLU 90      K 4.1   *   CO2 21*   BUN 10   CREATININE 0.8   CALCIUM 7.9*   MG 1.8       Significant Diagnostics:  I have reviewed all pertinent imaging results/findings within the past 24 hours.  Assessment/Plan:     Neoplasm of cecum  POD #2 Right colectomy  Clamp NG tube today and try clears  IVF  Pain control  PPI  IS use  MALCOLM Covington MD  General Surgery  [facility] - Med Surg (3rd Fl)

## 2024-11-07 NOTE — SUBJECTIVE & OBJECTIVE
Interval History: Patient doing well, up out of bed, abdomen rumbling, NG tube with nothing.     Medications:  Continuous Infusions:   0.9% NaCl   Intravenous Continuous 75 mL/hr at 11/06/24 0905 New Bag at 11/06/24 0905    dextrose 5 % and 0.45 % NaCl with KCl 20 mEq   Intravenous Continuous 75 mL/hr at 11/07/24 0338 New Bag at 11/07/24 0338     Scheduled Meds:  PRN Meds:  Current Facility-Administered Medications:     HYDROmorphone, 1 mg, Intravenous, Q2H PRN    ondansetron, 4 mg, Intravenous, Q12H PRN    traMADoL, 50 mg, Oral, Q4H PRN     Review of patient's allergies indicates:  No Known Allergies  Objective:     Vital Signs (Most Recent):  Temp: 98.5 °F (36.9 °C) (11/07/24 0733)  Pulse: 74 (11/07/24 0800)  Resp: 18 (11/07/24 0834)  BP: 131/60 (11/07/24 0733)  SpO2: 96 % (11/07/24 0733) Vital Signs (24h Range):  Temp:  [97.6 °F (36.4 °C)-98.5 °F (36.9 °C)] 98.5 °F (36.9 °C)  Pulse:  [] 74  Resp:  [14-20] 18  SpO2:  [94 %-96 %] 96 %  BP: (118-133)/(57-66) 131/60     Weight: 74 kg (163 lb 2.3 oz)  Body mass index is 23.41 kg/m².    Intake/Output - Last 3 Shifts         11/05 0700 11/06 0659 11/06 0700 11/07 0659 11/07 0700 11/08 0659    P.O. 0      I.V. (mL/kg) 940.7 (12.7)      IV Piggyback 100      Total Intake(mL/kg) 1040.7 (14.1)      Urine (mL/kg/hr) 300 400 (0.2)     Drains 25      Total Output 325 400     Net +715.7 -400                     Physical Exam  Vitals and nursing note reviewed.   Constitutional:       Appearance: Normal appearance. He is well-developed.   Cardiovascular:      Rate and Rhythm: Normal rate and regular rhythm.   Pulmonary:      Effort: Pulmonary effort is normal.      Breath sounds: Normal breath sounds.   Abdominal:      General: Bowel sounds are normal. There is distension.      Palpations: Abdomen is soft.      Tenderness: There is abdominal tenderness.   Musculoskeletal:      Cervical back: Normal range of motion and neck supple.   Skin:     General: Skin is warm and  dry.          Significant Labs:  I have reviewed all pertinent lab results within the past 24 hours.  CBC:   Recent Labs   Lab 11/06/24  0558   WBC 10.42   RBC 4.25*   HGB 13.7*   HCT 42.0      MCV 99*   MCH 32.2*   MCHC 32.6     BMP:   Recent Labs   Lab 11/06/24  0558   GLU 90      K 4.1   *   CO2 21*   BUN 10   CREATININE 0.8   CALCIUM 7.9*   MG 1.8       Significant Diagnostics:  I have reviewed all pertinent imaging results/findings within the past 24 hours.

## 2024-11-07 NOTE — PLAN OF CARE
Problem: Adult Inpatient Plan of Care  Goal: Plan of Care Review  Outcome: Progressing  Flowsheets (Taken 11/7/2024 6247)  Plan of Care Reviewed With:   patient   spouse  Goal: Patient-Specific Goal (Individualized)  Outcome: Progressing  Goal: Absence of Hospital-Acquired Illness or Injury  Outcome: Progressing  Goal: Optimal Comfort and Wellbeing  Outcome: Progressing  Goal: Readiness for Transition of Care  Outcome: Progressing     Problem: Wound  Goal: Optimal Coping  Outcome: Progressing  Goal: Optimal Functional Ability  Outcome: Progressing  Goal: Absence of Infection Signs and Symptoms  Outcome: Progressing  Goal: Improved Oral Intake  Outcome: Progressing  Goal: Optimal Pain Control and Function  Outcome: Progressing  Goal: Skin Health and Integrity  Outcome: Progressing  Goal: Optimal Wound Healing  Outcome: Progressing     Problem: Fall Injury Risk  Goal: Absence of Fall and Fall-Related Injury  Outcome: Progressing     Problem: Pain Acute  Goal: Optimal Pain Control and Function  Outcome: Progressing

## 2024-11-07 NOTE — SUBJECTIVE & OBJECTIVE
Past Medical History:   Diagnosis Date    Fractures        Past Surgical History:   Procedure Laterality Date    HAND SURGERY      SKULL FRACTURE ELEVATION         Review of patient's allergies indicates:  No Known Allergies    No current facility-administered medications on file prior to encounter.     Current Outpatient Medications on File Prior to Encounter   Medication Sig    carbamazepine (TEGRETOL XR) 100 MG 12 hr tablet Take 100 mg by mouth 2 (two) times daily.      CRESTOR 10 mg tablet      Family History    None       Tobacco Use    Smoking status: Some Days     Current packs/day: 0.50     Types: Cigarettes    Smokeless tobacco: Not on file   Substance and Sexual Activity    Alcohol use: No    Drug use: No    Sexual activity: Not on file     Review of Systems   Constitutional:  Negative for activity change, chills, fatigue, fever and unexpected weight change.   HENT:  Negative for sore throat and trouble swallowing.    Respiratory:  Negative for cough, chest tightness and shortness of breath.    Cardiovascular:  Negative for chest pain and leg swelling.   Gastrointestinal:  Positive for abdominal pain.        Recovering from hemicolectomy for colon cancer   Endocrine: Negative for cold intolerance and heat intolerance.   Genitourinary:  Negative for difficulty urinating.   Musculoskeletal:  Negative for back pain and joint swelling.   Skin:  Negative for rash.   Neurological:  Negative for numbness.   Hematological:  Negative for adenopathy.   Psychiatric/Behavioral:  Negative for decreased concentration.      Objective:     Vital Signs (Most Recent):  Temp: 98.4 °F (36.9 °C) (11/07/24 0421)  Pulse: 67 (11/07/24 0600)  Resp: 18 (11/07/24 0630)  BP: 131/61 (11/07/24 0421)  SpO2: 96 % (11/07/24 0421) Vital Signs (24h Range):  Temp:  [97.6 °F (36.4 °C)-98.4 °F (36.9 °C)] 98.4 °F (36.9 °C)  Pulse:  [] 67  Resp:  [14-20] 18  SpO2:  [94 %-97 %] 96 %  BP: (110-133)/(57-66) 131/61     Weight: 74 kg (163 lb  "2.3 oz)  Body mass index is 23.41 kg/m².     Physical Exam  Constitutional:       Appearance: Normal appearance. He is normal weight.      Comments: NG tube in place in the right nostril   HENT:      Head: Normocephalic and atraumatic.      Mouth/Throat:      Mouth: Mucous membranes are dry.   Eyes:      Extraocular Movements: Extraocular movements intact.      Pupils: Pupils are equal, round, and reactive to light.   Cardiovascular:      Rate and Rhythm: Normal rate and regular rhythm.      Pulses: Normal pulses.      Heart sounds: No murmur heard.     No friction rub. No gallop.   Pulmonary:      Effort: Pulmonary effort is normal.      Breath sounds: Normal breath sounds.   Abdominal:      General: Abdomen is flat.      Tenderness: There is abdominal tenderness.      Comments: Abdominal wounds bandage   Musculoskeletal:      Cervical back: Normal range of motion.      Right lower leg: No edema.      Left lower leg: No edema.   Neurological:      General: No focal deficit present.      Mental Status: He is alert.   Psychiatric:         Mood and Affect: Mood normal.          Significant Labs: All pertinent labs within the past 24 hours have been reviewed.  CBC:   Recent Labs   Lab 11/06/24  0558   WBC 10.42   HGB 13.7*   HCT 42.0        CMP:   Recent Labs   Lab 11/06/24  0558      K 4.1   *   CO2 21*   GLU 90   BUN 10   CREATININE 0.8   CALCIUM 7.9*   ANIONGAP 11     Magnesium:   Recent Labs   Lab 11/06/24  0558   MG 1.8     TSH: No results for input(s): "TSH" in the last 4320 hours.    Significant Imaging: I have reviewed all pertinent imaging results/findings within the past 24 hours.  I have reviewed and interpreted all pertinent imaging results/findings within the past 24 hours.  "

## 2024-11-07 NOTE — HOSPITAL COURSE
Medicine consulted earlier this admission for arrythmia. Given Mg and had no further episodes.  Medicine team called again evening of 11/9/24 for new onset a-fib RVR. HR 120s on average. EKG confirming a-fib. Labs OK and no acute electrolytes abnormalities. TSH normal.  Cardiology had evalauted patient prior. TTE with diastolic dysfunction but no valve abnormalities and EF normal. Their plan was for stress testing outpatient.    11/9/24 patient treated with IV metoprolol 5mg x2 doses and PO metoprolol 50mg PO. His HR did improved to 70-80s and remains normal throughout the evening though still in a-fib. Transitioned to treatment dose lovenox.    Today, patient will remain on telemetry on PO metoprolol. Will ask CM to assist with Eliquis pricing for outpatient use. If HR is controlled can discharge later today or tomorrow AM on metoprolol 50mg PO BID and Eliquis 5mg BID. Happy to write these two medication on discharge for surgery team if needed.  He will need close PCP and cardiology follow up later this week.     11/11 PT HD stable on room air.  Went into afib with RVR.  Received IV metoprolol.  Current HR 90's.  On lovenox and metoprolol.  Waiting for cardiology recommendations.    Replace potassium.      11/12: no acute events overnight. HR stable. Patient was made NPO for possible GIFTY cardioversion with cardiology today. Dispo pending cardiology recs. K 3.4-will replace

## 2024-11-07 NOTE — HOSPITAL COURSE
11/7/2024: Patient is POD #2 from right colectomy. Nothing out of NG tube, abdomen rumbling a bit today. Will try clears.   11/13/2024: Patient is doing well, eating, bowel movements, cleared from medicine and cards.

## 2024-11-07 NOTE — HPI
Medicine was consulted by surgery to see this 74-year-old white male status post hemicolectomy he has been having runs of V-tach.  These are all asymptomatic.  Patient denies chest pain, shortness of breath.  He still has an NG tube in place.

## 2024-11-07 NOTE — NURSING
Pt had 30 beat run of vtach. Dr. Covington and Dr. Sandra notified. Mg 2g IV x1 dose and consult [facility] cardiology per Dr. Sandra.

## 2024-11-07 NOTE — CONSULTS
[facility] ([facility])  [facility] Medicine  Consult Note    Patient Name: Abdifatah Steele  MRN: [id number]  Admission Date: 11/5/2024  Hospital Length of Stay: 2 days  Attending Physician: Connor Lopez Jr., *   Primary Care Provider: Mychal Valderrama MD           Patient information was obtained from patient, spouse/SO, and ER records.     Consults  Subjective:     Principal Problem: Small bowel obstruction    Chief Complaint: No chief complaint on file.       HPI: I was consulted by surgery to see this 74-year-old white male status post hemicolectomy he has been having runs of V-tach.  These are all asymptomatic.  Patient denies chest pain, shortness of breath.  He still has an NG tube in place.    Past Medical History:   Diagnosis Date    Fractures        Past Surgical History:   Procedure Laterality Date    HAND SURGERY      SKULL FRACTURE ELEVATION         Review of patient's allergies indicates:  No Known Allergies    No current facility-administered medications on file prior to encounter.     Current Outpatient Medications on File Prior to Encounter   Medication Sig    carbamazepine (TEGRETOL XR) 100 MG 12 hr tablet Take 100 mg by mouth 2 (two) times daily.      CRESTOR 10 mg tablet      Family History    None       Tobacco Use    Smoking status: Some Days     Current packs/day: 0.50     Types: Cigarettes    Smokeless tobacco: Not on file   Substance and Sexual Activity    Alcohol use: No    Drug use: No    Sexual activity: Not on file     Review of Systems   Constitutional:  Negative for activity change, chills, fatigue, fever and unexpected weight change.   HENT:  Negative for sore throat and trouble swallowing.    Respiratory:  Negative for cough, chest tightness and shortness of breath.    Cardiovascular:  Negative for chest pain and leg swelling.   Gastrointestinal:  Positive for abdominal pain.        Recovering from hemicolectomy for colon cancer   Endocrine: Negative for cold intolerance and heat  intolerance.   Genitourinary:  Negative for difficulty urinating.   Musculoskeletal:  Negative for back pain and joint swelling.   Skin:  Negative for rash.   Neurological:  Negative for numbness.   Hematological:  Negative for adenopathy.   Psychiatric/Behavioral:  Negative for decreased concentration.      Objective:     Vital Signs (Most Recent):  Temp: 98.4 °F (36.9 °C) (11/07/24 0421)  Pulse: 67 (11/07/24 0600)  Resp: 18 (11/07/24 0630)  BP: 131/61 (11/07/24 0421)  SpO2: 96 % (11/07/24 0421) Vital Signs (24h Range):  Temp:  [97.6 °F (36.4 °C)-98.4 °F (36.9 °C)] 98.4 °F (36.9 °C)  Pulse:  [] 67  Resp:  [14-20] 18  SpO2:  [94 %-97 %] 96 %  BP: (110-133)/(57-66) 131/61     Weight: 74 kg (163 lb 2.3 oz)  Body mass index is 23.41 kg/m².     Physical Exam  Constitutional:       Appearance: Normal appearance. He is normal weight.      Comments: NG tube in place in the right nostril   HENT:      Head: Normocephalic and atraumatic.      Mouth/Throat:      Mouth: Mucous membranes are dry.   Eyes:      Extraocular Movements: Extraocular movements intact.      Pupils: Pupils are equal, round, and reactive to light.   Cardiovascular:      Rate and Rhythm: Normal rate and regular rhythm.      Pulses: Normal pulses.      Heart sounds: No murmur heard.     No friction rub. No gallop.   Pulmonary:      Effort: Pulmonary effort is normal.      Breath sounds: Normal breath sounds.   Abdominal:      General: Abdomen is flat.      Tenderness: There is abdominal tenderness.      Comments: Abdominal wounds bandage   Musculoskeletal:      Cervical back: Normal range of motion.      Right lower leg: No edema.      Left lower leg: No edema.   Neurological:      General: No focal deficit present.      Mental Status: He is alert.   Psychiatric:         Mood and Affect: Mood normal.          Significant Labs: All pertinent labs within the past 24 hours have been reviewed.  CBC:   Recent Labs   Lab 11/06/24  0558   WBC 10.42   HGB  "13.7*   HCT 42.0        CMP:   Recent Labs   Lab 11/06/24  0558      K 4.1   *   CO2 21*   GLU 90   BUN 10   CREATININE 0.8   CALCIUM 7.9*   ANIONGAP 11     Magnesium:   Recent Labs   Lab 11/06/24  0558   MG 1.8     TSH: No results for input(s): "TSH" in the last 4320 hours.    Significant Imaging: I have reviewed all pertinent imaging results/findings within the past 24 hours.  I have reviewed and interpreted all pertinent imaging results/findings within the past 24 hours.  Assessment/Plan:     Supraventricular tachycardia  Patient has had several runs of ventricular tachycardia.  His magnesium level was 1.8 so I gave him 2 g of Mag and he has not had any runs of V-tach since.  Continue to monitor on telemetry.    Cardiology consulted        VTE Risk Mitigation (From admission, onward)           Ordered     IP VTE HIGH RISK PATIENT  Once         11/05/24 1413     Place sequential compression device  Until discontinued         11/05/24 1413                        Thank you for your consult. I will follow-up with patient. Please contact us if you have any additional questions.    Boaz Marsh MD  Department of Hospital Medicine   [place] - Med Surg (3rd Fl)      "

## 2024-11-07 NOTE — ASSESSMENT & PLAN NOTE
Patient has had several runs of ventricular tachycardia.  His magnesium level was 1.8 so I gave him 2 g of Mag and he has not had any runs of V-tach since.  Continue to monitor on telemetry.    Cardiology consulted

## 2024-11-08 PROBLEM — F17.200 SMOKER: Status: ACTIVE | Noted: 2024-11-08

## 2024-11-08 PROBLEM — I34.1 MVP (MITRAL VALVE PROLAPSE): Status: ACTIVE | Noted: 2024-11-08

## 2024-11-08 LAB
AV INDEX (PROSTH): 0.73
AV MEAN GRADIENT: 2.4 MMHG
AV PEAK GRADIENT: 5.8 MMHG
AV VALVE AREA BY VELOCITY RATIO: 2.1 CM²
AV VALVE AREA: 2.3 CM²
AV VELOCITY RATIO: 0.67
BSA FOR ECHO PROCEDURE: 1.91 M2
CV ECHO LV RWT: 0.34 CM
DOP CALC AO PEAK VEL: 1.2 M/S
DOP CALC AO VTI: 21 CM
DOP CALC LVOT AREA: 3.1 CM2
DOP CALC LVOT DIAMETER: 2 CM
DOP CALC LVOT PEAK VEL: 0.8 M/S
DOP CALC LVOT STROKE VOLUME: 48 CM3
DOP CALCLVOT PEAK VEL VTI: 15.3 CM
E WAVE DECELERATION TIME: 247.82 MSEC
E/A RATIO: 0.65
E/E' RATIO: 6.75 M/S
ECHO LV POSTERIOR WALL: 0.8 CM (ref 0.6–1.1)
FINAL PATHOLOGIC DIAGNOSIS: ABNORMAL
FINAL PATHOLOGIC DIAGNOSIS: NORMAL
FRACTIONAL SHORTENING: 42.6 % (ref 28–44)
GROSS: ABNORMAL
GROSS: NORMAL
INTERVENTRICULAR SEPTUM: 0.9 CM (ref 0.6–1.1)
IVRT: 79.92 MSEC
LA MAJOR: 4.68 CM
LEFT ATRIUM AREA SYSTOLIC (APICAL 2 CHAMBER): 14.09 CM2
LEFT ATRIUM AREA SYSTOLIC (APICAL 4 CHAMBER): 11.55 CM2
LEFT ATRIUM SIZE: 3.01 CM
LEFT ATRIUM VOLUME INDEX MOD: 15.4 ML/M2
LEFT ATRIUM VOLUME MOD: 29.41 ML
LEFT INTERNAL DIMENSION IN SYSTOLE: 2.7 CM (ref 2.1–4)
LEFT VENTRICLE DIASTOLIC VOLUME INDEX: 53.36 ML/M2
LEFT VENTRICLE DIASTOLIC VOLUME: 101.92 ML
LEFT VENTRICLE END SYSTOLIC VOLUME APICAL 2 CHAMBER: 37.12 ML
LEFT VENTRICLE END SYSTOLIC VOLUME APICAL 4 CHAMBER: 21.26 ML
LEFT VENTRICLE MASS INDEX: 69.3 G/M2
LEFT VENTRICLE SYSTOLIC VOLUME INDEX: 14.5 ML/M2
LEFT VENTRICLE SYSTOLIC VOLUME: 27.77 ML
LEFT VENTRICULAR INTERNAL DIMENSION IN DIASTOLE: 4.7 CM (ref 3.5–6)
LEFT VENTRICULAR MASS: 132.3 G
LV LATERAL E/E' RATIO: 6.75 M/S
LV SEPTAL E/E' RATIO: 6.75 M/S
LVED V (TEICH): 101.92 ML
LVES V (TEICH): 27.77 ML
LVOT MG: 1.03 MMHG
LVOT MV: 0.47 CM/S
Lab: ABNORMAL
Lab: NORMAL
MV PEAK A VEL: 0.83 M/S
MV PEAK E VEL: 0.54 M/S
MV STENOSIS PRESSURE HALF TIME: 60.82 MS
MV VALVE AREA P 1/2 METHOD: 3.62 CM2
PISA TR MAX VEL: 2.99 M/S
PULM VEIN S/D RATIO: 0.81
PV MV: 0.46 M/S
PV PEAK D VEL: 0.36 M/S
PV PEAK GRADIENT: 2 MMHG
PV PEAK S VEL: 0.29 M/S
PV PEAK VELOCITY: 0.75 M/S
RA MAJOR: 5.06 CM
RA PRESSURE ESTIMATED: 3 MMHG
RIGHT VENTRICULAR END-DIASTOLIC DIMENSION: 2.25 CM
RV TB RVSP: 6 MMHG
RV TISSUE DOPPLER FREE WALL SYSTOLIC VELOCITY 1 (APICAL 4 CHAMBER VIEW): 10.03 CM/S
TDI LATERAL: 0.08 M/S
TDI SEPTAL: 0.08 M/S
TDI: 0.08 M/S
TR MAX PG: 36 MMHG
TRICUSPID ANNULAR PLANE SYSTOLIC EXCURSION: 2.54 CM
TV REST PULMONARY ARTERY PRESSURE: 39 MMHG
Z-SCORE OF LEFT VENTRICULAR DIMENSION IN END DIASTOLE: -1.34
Z-SCORE OF LEFT VENTRICULAR DIMENSION IN END SYSTOLE: -1.61

## 2024-11-08 PROCEDURE — 21400001 HC TELEMETRY ROOM

## 2024-11-08 PROCEDURE — 63600175 PHARM REV CODE 636 W HCPCS: Performed by: SURGERY

## 2024-11-08 PROCEDURE — 94761 N-INVAS EAR/PLS OXIMETRY MLT: CPT

## 2024-11-08 PROCEDURE — 99900035 HC TECH TIME PER 15 MIN (STAT)

## 2024-11-08 PROCEDURE — 27000221 HC OXYGEN, UP TO 24 HOURS

## 2024-11-08 PROCEDURE — 99900031 HC PATIENT EDUCATION (STAT)

## 2024-11-08 PROCEDURE — 99024 POSTOP FOLLOW-UP VISIT: CPT | Mod: ,,, | Performed by: SURGERY

## 2024-11-08 PROCEDURE — 25000003 PHARM REV CODE 250: Performed by: SURGERY

## 2024-11-08 RX ORDER — HYDROCODONE BITARTRATE AND ACETAMINOPHEN 5; 325 MG/1; MG/1
1 TABLET ORAL EVERY 6 HOURS PRN
Qty: 20 TABLET | Refills: 0 | Status: SHIPPED | OUTPATIENT
Start: 2024-11-08

## 2024-11-08 RX ADMIN — DEXTROSE, SODIUM CHLORIDE, AND POTASSIUM CHLORIDE: 5; .45; .15 INJECTION INTRAVENOUS at 07:11

## 2024-11-08 RX ADMIN — ENOXAPARIN SODIUM 40 MG: 40 INJECTION SUBCUTANEOUS at 04:11

## 2024-11-08 RX ADMIN — ACETAMINOPHEN 1000 MG: 10 INJECTION, SOLUTION INTRAVENOUS at 06:11

## 2024-11-08 RX ADMIN — HYDROMORPHONE HYDROCHLORIDE 1 MG: 1 INJECTION, SOLUTION INTRAMUSCULAR; INTRAVENOUS; SUBCUTANEOUS at 07:11

## 2024-11-08 RX ADMIN — TRAMADOL HYDROCHLORIDE 50 MG: 50 TABLET, COATED ORAL at 11:11

## 2024-11-08 RX ADMIN — PANTOPRAZOLE SODIUM 40 MG: 40 INJECTION, POWDER, FOR SOLUTION INTRAVENOUS at 10:11

## 2024-11-08 RX ADMIN — HYDROMORPHONE HYDROCHLORIDE 1 MG: 1 INJECTION, SOLUTION INTRAMUSCULAR; INTRAVENOUS; SUBCUTANEOUS at 02:11

## 2024-11-08 RX ADMIN — HYDROMORPHONE HYDROCHLORIDE 1 MG: 1 INJECTION, SOLUTION INTRAMUSCULAR; INTRAVENOUS; SUBCUTANEOUS at 04:11

## 2024-11-08 NOTE — CONSULTS
[place] - [facility] Surg ([facility])  Cardiology  Consult Note    Patient Name: Abdifatah Steele  MRN: [id number]  Admission Date: 11/5/2024  Hospital Length of Stay: 3 days  Code Status: No Order   Attending Provider: Connor Lopez Jr., *   Consulting Provider: Sindi Cristina NP  Primary Care Physician: Mychal Valderrama MD  Principal Problem:Small bowel obstruction    Patient information was obtained from patient and ER records.     Inpatient consult to Cardiology-[facility]  Consult performed by: Sindi Cristina NP  Consult ordered by: Boaz Marsh MD        Subjective:     Chief Complaint:  SVT    HPI:   74-year-old with past medical history of SVT, MPV, heavy smoker presents with severe bradycardia during small-bowel obstruction procedure.  Heart rate was noted to be in the 30s during the procedure.  He remains inpatient in is now experiencing periods of SVT, longest 30 beat run.  He was asymptomatic and watching TV during this occurrence.  EKG right bundle branch block, nonischemic.  He does not see Cardiology but reports a history of mitral valve prolapse and not following up with Cardiology.  He reports HUYNH but denies chest pain, palpitations, swelling to lower extremities, orthopnea.    Past Medical History:   Diagnosis Date    Fractures        Past Surgical History:   Procedure Laterality Date    HAND SURGERY      SKULL FRACTURE ELEVATION         Review of patient's allergies indicates:  No Known Allergies    No current facility-administered medications on file prior to encounter.     Current Outpatient Medications on File Prior to Encounter   Medication Sig    carbamazepine (TEGRETOL XR) 100 MG 12 hr tablet Take 100 mg by mouth 2 (two) times daily.      CRESTOR 10 mg tablet      Family History    None       Tobacco Use    Smoking status: Some Days     Current packs/day: 0.50     Types: Cigarettes    Smokeless tobacco: Not on file   Substance and Sexual Activity    Alcohol use: No    Drug use: No    Sexual  activity: Not on file     Review of Systems   Constitutional: Negative.   Cardiovascular:  Positive for dyspnea on exertion. Negative for chest pain, claudication, cyanosis, irregular heartbeat, leg swelling, near-syncope, orthopnea, palpitations, paroxysmal nocturnal dyspnea and syncope.   Respiratory:  Positive for cough.    Gastrointestinal:         Abdominal surgery incision     Objective:     Vital Signs (Most Recent):  Temp: 98.1 °F (36.7 °C) (11/08/24 1151)  Pulse: 78 (11/08/24 1200)  Resp: 20 (11/08/24 1151)  BP: 136/63 (11/08/24 1151)  SpO2: (!) 93 % (11/08/24 1151) Vital Signs (24h Range):  Temp:  [97.9 °F (36.6 °C)-99.2 °F (37.3 °C)] 98.1 °F (36.7 °C)  Pulse:  [68-89] 78  Resp:  [16-20] 20  SpO2:  [93 %-96 %] 93 %  BP: (125-145)/(63-72) 136/63     Weight: 74 kg (163 lb 2.3 oz)  Body mass index is 23.41 kg/m².    SpO2: (!) 93 %         Intake/Output Summary (Last 24 hours) at 11/8/2024 1310  Last data filed at 11/8/2024 0816  Gross per 24 hour   Intake 222 ml   Output 100 ml   Net 122 ml       Lines/Drains/Airways       Drain  Duration                  NG/OG Tube 11/05/24 1800 Left nostril 2 days              Peripheral Intravenous Line  Duration                  Peripheral IV - Single Lumen 11/05/24 1118 22 G 1 in No Left;Posterior Forearm 3 days                     Physical Exam  Cardiovascular:      Rate and Rhythm: Normal rate. Rhythm irregular.      Pulses: Normal pulses.      Heart sounds: Murmur heard.   Pulmonary:      Effort: Pulmonary effort is normal.      Breath sounds: Rhonchi present.   Musculoskeletal:         General: Normal range of motion.      Right lower leg: No edema.      Left lower leg: No edema.   Skin:     General: Skin is warm.   Neurological:      Mental Status: He is alert and oriented to person, place, and time.   Psychiatric:         Mood and Affect: Mood normal.          Significant Labs: CMP   Recent Labs   Lab 11/07/24  0827      K 3.9      CO2 28   *  "  BUN 8   CREATININE 0.7   CALCIUM 8.3*   PROT 5.7*   ALBUMIN 2.7*   BILITOT 0.4   ALKPHOS 56   AST 14   ALT 13   ANIONGAP 5*   , CBC   Recent Labs   Lab 11/07/24  0827   WBC 7.57   HGB 13.5*   HCT 41.0      , Lipid Panel No results for input(s): "CHOL", "HDL", "LDLCALC", "TRIG", "CHOLHDL" in the last 48 hours., and Troponin No results for input(s): "TROPONINI" in the last 48 hours.    Significant Imaging: Echocardiogram: Transthoracic echo (TTE) complete ([facility] Only):   Results for orders placed or performed during the hospital encounter of 11/05/24   Echo   Result Value Ref Range    BSA 1.91 m2    LVOT stroke volume 48.0 cm3    LVIDd 4.7 3.5 - 6.0 cm    LV Systolic Volume 27.77 mL    LV Systolic Volume Index 14.5 mL/m2    LVIDs 2.7 2.1 - 4.0 cm    LV ESV A2C 37.12 mL    LV Diastolic Volume 101.92 mL    LV ESV A4C 21.26 mL    LV Diastolic Volume Index 53.36 mL/m2    Left Ventricular End Systolic Volume by Teichholz Method 27.77 mL    Left Ventricular End Diastolic Volume by Teichholz Method 101.92 mL    IVS 0.9 0.6 - 1.1 cm    LVOT diameter 2.0 cm    LVOT area 3.1 cm2    FS 42.6 28 - 44 %    Left Ventricle Relative Wall Thickness 0.34 cm    PW 0.8 0.6 - 1.1 cm    LV mass 132.3 g    LV Mass Index 69.3 g/m2    MV Peak E Pollo 0.54 m/s    TDI LATERAL 0.08 m/s    TDI SEPTAL 0.08 m/s    E/E' ratio 6.75 m/s    MV Peak A Pollo 0.83 m/s    TR Max Pollo 2.99 m/s    E/A ratio 0.65     IVRT 79.92 msec    E wave deceleration time 247.82 msec    LV SEPTAL E/E' RATIO 6.75 m/s    LV LATERAL E/E' RATIO 6.75 m/s    PV Peak S Pollo 0.29 m/s    PV Peak D Pollo 0.36 m/s    Pulm vein S/D ratio 0.81     LVOT peak pollo 0.8 m/s    Left Ventricular Outflow Tract Mean Velocity 0.47 cm/s    Left Ventricular Outflow Tract Mean Gradient 1.03 mmHg    RV S' 10.03 cm/s    TAPSE 2.54 cm    LA size 3.01 cm    Left Atrium Major Axis 4.68 cm    LA Vol (MOD) 29.41 mL    COREY (MOD) 15.4 mL/m2    RA Major Axis 5.06 cm    AV mean gradient 2.4 mmHg    AV peak " gradient 5.8 mmHg    Ao peak drea 1.2 m/s    Ao VTI 21.0 cm    LVOT peak VTI 15.3 cm    AV valve area 2.3 cm²    AV Velocity Ratio 0.67     AV index (prosthetic) 0.73     FADI by Velocity Ratio 2.1 cm²    MV stenosis pressure 1/2 time 60.82 ms    MV valve area p 1/2 method 3.62 cm2    Triscuspid Valve Regurgitation Peak Gradient 36 mmHg    PV PEAK VELOCITY 0.75 m/s    PV peak gradient 2 mmHg    Pulmonary Valve Mean Velocity 0.46 m/s    Mean e' 0.08 m/s    ZLVIDS -1.61     ZLVIDD -1.34     LA area A4C 11.55 cm2    LA area A2C 14.09 cm2    RVDD 2.25 cm     Assessment and Plan:     Supraventricular tachycardia  30 beat run per tele monitor; received magnesium  Asymptomatic  Colon surgery 11/5/2024 and severe bradycardia during procedure, heart rate 30s  No further reoccurrence    Check echocardiogram to assess LV function and valvular structure  Case discussed with Dr. Linda  We will attempt to get Lexiscan in a.m. if not, outpatient  Continue to monitor on telemetry        VTE Risk Mitigation (From admission, onward)           Ordered     enoxaparin injection 40 mg  Every 24 hours         11/07/24 0839     IP VTE HIGH RISK PATIENT  Once         11/05/24 1413     Place sequential compression device  Until discontinued         11/05/24 1413                    Thank you for your consult. I will follow-up with patient. Please contact us if you have any additional questions.    Sindi Cristina NP  Cardiology   [place] - Med Surg (3rd Fl)

## 2024-11-08 NOTE — PLAN OF CARE
Problem: Adult Inpatient Plan of Care  Goal: Plan of Care Review  Outcome: Progressing  Goal: Patient-Specific Goal (Individualized)  Outcome: Progressing  Goal: Absence of Hospital-Acquired Illness or Injury  Outcome: Progressing  Goal: Optimal Comfort and Wellbeing  Outcome: Progressing  Goal: Readiness for Transition of Care  Outcome: Progressing     Problem: Wound  Goal: Optimal Coping  Outcome: Progressing  Goal: Optimal Functional Ability  Outcome: Progressing  Goal: Absence of Infection Signs and Symptoms  Outcome: Progressing  Goal: Improved Oral Intake  Outcome: Progressing  Goal: Optimal Pain Control and Function  Outcome: Progressing  Goal: Skin Health and Integrity  Outcome: Progressing  Goal: Optimal Wound Healing  Outcome: Progressing     Problem: Fall Injury Risk  Goal: Absence of Fall and Fall-Related Injury  Outcome: Progressing     Problem: Pain Acute  Goal: Optimal Pain Control and Function  Outcome: Progressing     Problem: Skin Injury Risk Increased  Goal: Skin Health and Integrity  Outcome: Progressing

## 2024-11-08 NOTE — ASSESSMENT & PLAN NOTE
30 beat run per tele monitor; received magnesium  Asymptomatic  Colon surgery 11/5/2024 and severe bradycardia during procedure, heart rate 30s  No further reoccurrence    Check echocardiogram to assess LV function and valvular structure  Case discussed with Dr. Linda  We will attempt to get Lexiscan in a.m. if not, outpatient  Continue to monitor on telemetry

## 2024-11-08 NOTE — SUBJECTIVE & OBJECTIVE
Past Medical History:   Diagnosis Date    Fractures        Past Surgical History:   Procedure Laterality Date    HAND SURGERY      SKULL FRACTURE ELEVATION         Review of patient's allergies indicates:  No Known Allergies    No current facility-administered medications on file prior to encounter.     Current Outpatient Medications on File Prior to Encounter   Medication Sig    carbamazepine (TEGRETOL XR) 100 MG 12 hr tablet Take 100 mg by mouth 2 (two) times daily.      CRESTOR 10 mg tablet      Family History    None       Tobacco Use    Smoking status: Some Days     Current packs/day: 0.50     Types: Cigarettes    Smokeless tobacco: Not on file   Substance and Sexual Activity    Alcohol use: No    Drug use: No    Sexual activity: Not on file     Review of Systems   Constitutional: Negative.   Cardiovascular:  Positive for dyspnea on exertion. Negative for chest pain, claudication, cyanosis, irregular heartbeat, leg swelling, near-syncope, orthopnea, palpitations, paroxysmal nocturnal dyspnea and syncope.   Respiratory:  Positive for cough.    Gastrointestinal:         Abdominal surgery incision     Objective:     Vital Signs (Most Recent):  Temp: 98.1 °F (36.7 °C) (11/08/24 1151)  Pulse: 78 (11/08/24 1200)  Resp: 20 (11/08/24 1151)  BP: 136/63 (11/08/24 1151)  SpO2: (!) 93 % (11/08/24 1151) Vital Signs (24h Range):  Temp:  [97.9 °F (36.6 °C)-99.2 °F (37.3 °C)] 98.1 °F (36.7 °C)  Pulse:  [68-89] 78  Resp:  [16-20] 20  SpO2:  [93 %-96 %] 93 %  BP: (125-145)/(63-72) 136/63     Weight: 74 kg (163 lb 2.3 oz)  Body mass index is 23.41 kg/m².    SpO2: (!) 93 %         Intake/Output Summary (Last 24 hours) at 11/8/2024 1310  Last data filed at 11/8/2024 0816  Gross per 24 hour   Intake 222 ml   Output 100 ml   Net 122 ml       Lines/Drains/Airways       Drain  Duration                  NG/OG Tube 11/05/24 1800 Left nostril 2 days              Peripheral Intravenous Line  Duration                  Peripheral IV - Single  "Lumen 11/05/24 1118 22 G 1 in No Left;Posterior Forearm 3 days                     Physical Exam  Cardiovascular:      Rate and Rhythm: Normal rate. Rhythm irregular.      Pulses: Normal pulses.      Heart sounds: Murmur heard.   Pulmonary:      Effort: Pulmonary effort is normal.      Breath sounds: Rhonchi present.   Musculoskeletal:         General: Normal range of motion.      Right lower leg: No edema.      Left lower leg: No edema.   Skin:     General: Skin is warm.   Neurological:      Mental Status: He is alert and oriented to person, place, and time.   Psychiatric:         Mood and Affect: Mood normal.          Significant Labs: CMP   Recent Labs   Lab 11/07/24  0827      K 3.9      CO2 28   *   BUN 8   CREATININE 0.7   CALCIUM 8.3*   PROT 5.7*   ALBUMIN 2.7*   BILITOT 0.4   ALKPHOS 56   AST 14   ALT 13   ANIONGAP 5*   , CBC   Recent Labs   Lab 11/07/24  0827   WBC 7.57   HGB 13.5*   HCT 41.0      , Lipid Panel No results for input(s): "CHOL", "HDL", "LDLCALC", "TRIG", "CHOLHDL" in the last 48 hours., and Troponin No results for input(s): "TROPONINI" in the last 48 hours.    Significant Imaging: Echocardiogram: Transthoracic echo (TTE) complete ([facility] Only):   Results for orders placed or performed during the hospital encounter of 11/05/24   Echo   Result Value Ref Range    BSA 1.91 m2    LVOT stroke volume 48.0 cm3    LVIDd 4.7 3.5 - 6.0 cm    LV Systolic Volume 27.77 mL    LV Systolic Volume Index 14.5 mL/m2    LVIDs 2.7 2.1 - 4.0 cm    LV ESV A2C 37.12 mL    LV Diastolic Volume 101.92 mL    LV ESV A4C 21.26 mL    LV Diastolic Volume Index 53.36 mL/m2    Left Ventricular End Systolic Volume by Teichholz Method 27.77 mL    Left Ventricular End Diastolic Volume by Teichholz Method 101.92 mL    IVS 0.9 0.6 - 1.1 cm    LVOT diameter 2.0 cm    LVOT area 3.1 cm2    FS 42.6 28 - 44 %    Left Ventricle Relative Wall Thickness 0.34 cm    PW 0.8 0.6 - 1.1 cm    LV mass 132.3 g    LV Mass " Index 69.3 g/m2    MV Peak E Pollo 0.54 m/s    TDI LATERAL 0.08 m/s    TDI SEPTAL 0.08 m/s    E/E' ratio 6.75 m/s    MV Peak A Pollo 0.83 m/s    TR Max Pollo 2.99 m/s    E/A ratio 0.65     IVRT 79.92 msec    E wave deceleration time 247.82 msec    LV SEPTAL E/E' RATIO 6.75 m/s    LV LATERAL E/E' RATIO 6.75 m/s    PV Peak S Pollo 0.29 m/s    PV Peak D Pollo 0.36 m/s    Pulm vein S/D ratio 0.81     LVOT peak pollo 0.8 m/s    Left Ventricular Outflow Tract Mean Velocity 0.47 cm/s    Left Ventricular Outflow Tract Mean Gradient 1.03 mmHg    RV S' 10.03 cm/s    TAPSE 2.54 cm    LA size 3.01 cm    Left Atrium Major Axis 4.68 cm    LA Vol (MOD) 29.41 mL    COREY (MOD) 15.4 mL/m2    RA Major Axis 5.06 cm    AV mean gradient 2.4 mmHg    AV peak gradient 5.8 mmHg    Ao peak pollo 1.2 m/s    Ao VTI 21.0 cm    LVOT peak VTI 15.3 cm    AV valve area 2.3 cm²    AV Velocity Ratio 0.67     AV index (prosthetic) 0.73     FADI by Velocity Ratio 2.1 cm²    MV stenosis pressure 1/2 time 60.82 ms    MV valve area p 1/2 method 3.62 cm2    Triscuspid Valve Regurgitation Peak Gradient 36 mmHg    PV PEAK VELOCITY 0.75 m/s    PV peak gradient 2 mmHg    Pulmonary Valve Mean Velocity 0.46 m/s    Mean e' 0.08 m/s    ZLVIDS -1.61     ZLVIDD -1.34     LA area A4C 11.55 cm2    LA area A2C 14.09 cm2    RVDD 2.25 cm

## 2024-11-08 NOTE — HPI
74-year-old with past medical history of SVT, currently post hemicolectomy. [facility] consulted for new onset atrial fibrillation with RVR.

## 2024-11-09 LAB
ALBUMIN SERPL BCP-MCNC: 2.6 G/DL (ref 3.5–5.2)
ALP SERPL-CCNC: 60 U/L (ref 40–150)
ALT SERPL W/O P-5'-P-CCNC: 10 U/L (ref 10–44)
ANION GAP SERPL CALC-SCNC: 11 MMOL/L (ref 8–16)
AST SERPL-CCNC: 11 U/L (ref 10–40)
BASOPHILS # BLD AUTO: 0.02 K/UL (ref 0–0.2)
BASOPHILS NFR BLD: 0.3 % (ref 0–1.9)
BILIRUB SERPL-MCNC: 0.4 MG/DL (ref 0.1–1)
BUN SERPL-MCNC: 7 MG/DL (ref 8–23)
CALCIUM SERPL-MCNC: 8.4 MG/DL (ref 8.7–10.5)
CHLORIDE SERPL-SCNC: 106 MMOL/L (ref 95–110)
CO2 SERPL-SCNC: 25 MMOL/L (ref 23–29)
CREAT SERPL-MCNC: 0.6 MG/DL (ref 0.5–1.4)
DIFFERENTIAL METHOD BLD: ABNORMAL
EOSINOPHIL # BLD AUTO: 0.2 K/UL (ref 0–0.5)
EOSINOPHIL NFR BLD: 3.7 % (ref 0–8)
ERYTHROCYTE [DISTWIDTH] IN BLOOD BY AUTOMATED COUNT: 13.4 % (ref 11.5–14.5)
EST. GFR  (NO RACE VARIABLE): >60 ML/MIN/1.73 M^2
GLUCOSE SERPL-MCNC: 106 MG/DL (ref 70–110)
HCT VFR BLD AUTO: 41 % (ref 40–54)
HGB BLD-MCNC: 14.1 G/DL (ref 14–18)
IMM GRANULOCYTES # BLD AUTO: 0.02 K/UL (ref 0–0.04)
IMM GRANULOCYTES NFR BLD AUTO: 0.3 % (ref 0–0.5)
LYMPHOCYTES # BLD AUTO: 0.8 K/UL (ref 1–4.8)
LYMPHOCYTES NFR BLD: 12.8 % (ref 18–48)
MAGNESIUM SERPL-MCNC: 2.1 MG/DL (ref 1.6–2.6)
MCH RBC QN AUTO: 32.3 PG (ref 27–31)
MCHC RBC AUTO-ENTMCNC: 34.4 G/DL (ref 32–36)
MCV RBC AUTO: 94 FL (ref 82–98)
MONOCYTES # BLD AUTO: 0.5 K/UL (ref 0.3–1)
MONOCYTES NFR BLD: 7.5 % (ref 4–15)
NEUTROPHILS # BLD AUTO: 4.9 K/UL (ref 1.8–7.7)
NEUTROPHILS NFR BLD: 75.4 % (ref 38–73)
NRBC BLD-RTO: 0 /100 WBC
PLATELET # BLD AUTO: 258 K/UL (ref 150–450)
PMV BLD AUTO: 9.6 FL (ref 9.2–12.9)
POTASSIUM SERPL-SCNC: 3.6 MMOL/L (ref 3.5–5.1)
PROT SERPL-MCNC: 6 G/DL (ref 6–8.4)
RBC # BLD AUTO: 4.36 M/UL (ref 4.6–6.2)
SODIUM SERPL-SCNC: 142 MMOL/L (ref 136–145)
TSH SERPL DL<=0.005 MIU/L-ACNC: 1.32 UIU/ML (ref 0.4–4)
WBC # BLD AUTO: 6.55 K/UL (ref 3.9–12.7)

## 2024-11-09 PROCEDURE — 25000003 PHARM REV CODE 250: Performed by: SURGERY

## 2024-11-09 PROCEDURE — 93005 ELECTROCARDIOGRAM TRACING: CPT

## 2024-11-09 PROCEDURE — 25000003 PHARM REV CODE 250

## 2024-11-09 PROCEDURE — 83735 ASSAY OF MAGNESIUM: CPT | Performed by: SURGERY

## 2024-11-09 PROCEDURE — 36415 COLL VENOUS BLD VENIPUNCTURE: CPT | Performed by: SURGERY

## 2024-11-09 PROCEDURE — 99900035 HC TECH TIME PER 15 MIN (STAT)

## 2024-11-09 PROCEDURE — 94761 N-INVAS EAR/PLS OXIMETRY MLT: CPT

## 2024-11-09 PROCEDURE — 63600175 PHARM REV CODE 636 W HCPCS: Performed by: SURGERY

## 2024-11-09 PROCEDURE — 84443 ASSAY THYROID STIM HORMONE: CPT | Performed by: INTERNAL MEDICINE

## 2024-11-09 PROCEDURE — 21400001 HC TELEMETRY ROOM

## 2024-11-09 PROCEDURE — 94799 UNLISTED PULMONARY SVC/PX: CPT

## 2024-11-09 PROCEDURE — 85025 COMPLETE CBC W/AUTO DIFF WBC: CPT | Performed by: SURGERY

## 2024-11-09 PROCEDURE — 93010 ELECTROCARDIOGRAM REPORT: CPT | Mod: ,,, | Performed by: INTERNAL MEDICINE

## 2024-11-09 PROCEDURE — 25000003 PHARM REV CODE 250: Performed by: INTERNAL MEDICINE

## 2024-11-09 PROCEDURE — 80053 COMPREHEN METABOLIC PANEL: CPT | Performed by: SURGERY

## 2024-11-09 RX ORDER — DOCUSATE SODIUM 100 MG/1
100 CAPSULE, LIQUID FILLED ORAL 2 TIMES DAILY
Status: DISCONTINUED | OUTPATIENT
Start: 2024-11-09 | End: 2024-11-13 | Stop reason: HOSPADM

## 2024-11-09 RX ORDER — ADHESIVE BANDAGE
30 BANDAGE TOPICAL ONCE
Status: COMPLETED | OUTPATIENT
Start: 2024-11-09 | End: 2024-11-09

## 2024-11-09 RX ORDER — PANTOPRAZOLE SODIUM 40 MG/1
40 TABLET, DELAYED RELEASE ORAL DAILY
Status: DISCONTINUED | OUTPATIENT
Start: 2024-11-10 | End: 2024-11-13 | Stop reason: HOSPADM

## 2024-11-09 RX ORDER — METOPROLOL TARTRATE 1 MG/ML
5 INJECTION, SOLUTION INTRAVENOUS EVERY 5 MIN PRN
Status: COMPLETED | OUTPATIENT
Start: 2024-11-09 | End: 2024-11-11

## 2024-11-09 RX ORDER — METOPROLOL TARTRATE 1 MG/ML
INJECTION, SOLUTION INTRAVENOUS
Status: COMPLETED
Start: 2024-11-09 | End: 2024-11-09

## 2024-11-09 RX ORDER — HYDROCODONE BITARTRATE AND ACETAMINOPHEN 7.5; 325 MG/1; MG/1
1 TABLET ORAL EVERY 6 HOURS PRN
Status: DISCONTINUED | OUTPATIENT
Start: 2024-11-09 | End: 2024-11-13 | Stop reason: HOSPADM

## 2024-11-09 RX ORDER — ENOXAPARIN SODIUM 100 MG/ML
1 INJECTION SUBCUTANEOUS EVERY 12 HOURS
Status: DISCONTINUED | OUTPATIENT
Start: 2024-11-10 | End: 2024-11-13

## 2024-11-09 RX ORDER — METOPROLOL TARTRATE 50 MG/1
50 TABLET ORAL 2 TIMES DAILY
Status: DISCONTINUED | OUTPATIENT
Start: 2024-11-09 | End: 2024-11-13 | Stop reason: HOSPADM

## 2024-11-09 RX ORDER — TRAMADOL HYDROCHLORIDE 50 MG/1
50 TABLET ORAL EVERY 4 HOURS PRN
Status: DISCONTINUED | OUTPATIENT
Start: 2024-11-09 | End: 2024-11-13 | Stop reason: HOSPADM

## 2024-11-09 RX ADMIN — ENOXAPARIN SODIUM 40 MG: 40 INJECTION SUBCUTANEOUS at 05:11

## 2024-11-09 RX ADMIN — PANTOPRAZOLE SODIUM 40 MG: 40 INJECTION, POWDER, FOR SOLUTION INTRAVENOUS at 08:11

## 2024-11-09 RX ADMIN — DOCUSATE SODIUM 100 MG: 100 CAPSULE, LIQUID FILLED ORAL at 08:11

## 2024-11-09 RX ADMIN — DOCUSATE SODIUM 100 MG: 100 CAPSULE, LIQUID FILLED ORAL at 10:11

## 2024-11-09 RX ADMIN — TRAMADOL HYDROCHLORIDE 50 MG: 50 TABLET, COATED ORAL at 03:11

## 2024-11-09 RX ADMIN — METOROPROLOL TARTRATE 5 MG: 5 INJECTION, SOLUTION INTRAVENOUS at 07:11

## 2024-11-09 RX ADMIN — HYDROCODONE BITARTRATE AND ACETAMINOPHEN 1 TABLET: 7.5; 325 TABLET ORAL at 11:11

## 2024-11-09 RX ADMIN — HYDROCODONE BITARTRATE AND ACETAMINOPHEN 1 TABLET: 7.5; 325 TABLET ORAL at 05:11

## 2024-11-09 RX ADMIN — METOPROLOL TARTRATE 50 MG: 50 TABLET, FILM COATED ORAL at 07:11

## 2024-11-09 RX ADMIN — METOROPROLOL TARTRATE 5 MG: 5 INJECTION, SOLUTION INTRAVENOUS at 09:11

## 2024-11-09 RX ADMIN — MAGNESIUM HYDROXIDE 2400 MG: 400 SUSPENSION ORAL at 10:11

## 2024-11-09 RX ADMIN — HYDROMORPHONE HYDROCHLORIDE 1 MG: 1 INJECTION, SOLUTION INTRAMUSCULAR; INTRAVENOUS; SUBCUTANEOUS at 06:11

## 2024-11-09 NOTE — PLAN OF CARE
Problem: Adult Inpatient Plan of Care  Goal: Plan of Care Review  Outcome: Progressing     Problem: Wound  Goal: Optimal Functional Ability  Outcome: Progressing     Problem: Wound  Goal: Skin Health and Integrity  Outcome: Progressing     Problem: Wound  Goal: Optimal Wound Healing  Outcome: Progressing     Problem: Fall Injury Risk  Goal: Absence of Fall and Fall-Related Injury  Outcome: Progressing     Problem: Pain Acute  Goal: Optimal Pain Control and Function  Outcome: Progressing     Problem: Skin Injury Risk Increased  Goal: Skin Health and Integrity  Outcome: Progressing

## 2024-11-09 NOTE — NURSING
Received call from Dr. Morse, discussed discharge orders. Patient report given of patient with No BM since before sx date 11/5/24. Discussed patient concern of not going home before having a BM. Patient and wife reports Dr. Lopez said he would not be going home till he has a BM. Discussed medication for stool softener/constipation prn.  Reviewed prn pain medication and pain scale. Informed surgeon I education patient on ambulation and post op care. Dr. Morse reports patient will be not be discharged today.

## 2024-11-09 NOTE — PLAN OF CARE
Problem: Adult Inpatient Plan of Care  Goal: Plan of Care Review  Outcome: Progressing  Goal: Patient-Specific Goal (Individualized)  Outcome: Progressing  Goal: Absence of Hospital-Acquired Illness or Injury  Outcome: Progressing  Goal: Optimal Comfort and Wellbeing  Outcome: Progressing  Goal: Readiness for Transition of Care  Outcome: Progressing     Problem: Wound  Goal: Optimal Coping  Outcome: Progressing  Goal: Optimal Functional Ability  Outcome: Progressing  Goal: Absence of Infection Signs and Symptoms  Outcome: Progressing  Goal: Improved Oral Intake  Outcome: Progressing  Goal: Optimal Pain Control and Function  Outcome: Progressing  Goal: Skin Health and Integrity  Outcome: Progressing  Goal: Optimal Wound Healing  Outcome: Progressing     Problem: Fall Injury Risk  Goal: Absence of Fall and Fall-Related Injury  Outcome: Progressing     Problem: Pain Acute  Goal: Optimal Pain Control and Function  Outcome: Progressing     Problem: Skin Injury Risk Increased  Goal: Skin Health and Integrity  Outcome: Progressing     Problem: Gas Exchange Impaired  Goal: Optimal Gas Exchange  Outcome: Progressing

## 2024-11-09 NOTE — PROGRESS NOTES
[facility] ([facility])  General Surgery  Progress Note    Subjective:     Interval History:   Patient NG tube removed earlier today.  Patient on diet.  Passing flatus.  Denies abdominal pain.  Denies nausea vomiting.  Denies fever chills.  No bowel movement  Post-Op Info:  Procedure(s) (LRB):  LAPAROSCOPY, DIAGNOSTIC (ATTEMPTED) (N/A)  LAPAROTOMY, EXPLORATORY (N/A)  COLON RESECTION (Right)   3 Days Post-Op      Medications:  Continuous Infusions:   dextrose 5 % and 0.45 % NaCl with KCl 20 mEq   Intravenous Continuous 75 mL/hr at 11/08/24 1613 Rate Verify at 11/08/24 1613     Scheduled Meds:   enoxparin  40 mg Subcutaneous Q24H (prophylaxis, 1700)    pantoprazole  40 mg Intravenous Daily     PRN Meds:  Current Facility-Administered Medications:     HYDROmorphone, 1 mg, Intravenous, Q2H PRN    nicotine, 1 patch, Transdermal, Daily PRN    ondansetron, 4 mg, Intravenous, Q12H PRN    traMADoL, 50 mg, Oral, Q4H PRN     Objective:     Vital Signs (Most Recent):  Temp: 98.3 °F (36.8 °C) (11/08/24 1533)  Pulse: 75 (11/08/24 1800)  Resp: 20 (11/08/24 1533)  BP: (!) 148/70 (11/08/24 1533)  SpO2: (!) 94 % (11/08/24 1533) Vital Signs (24h Range):  Temp:  [97.9 °F (36.6 °C)-98.3 °F (36.8 °C)] 98.3 °F (36.8 °C)  Pulse:  [68-78] 75  Resp:  [16-20] 20  SpO2:  [93 %-96 %] 94 %  BP: (125-148)/(63-72) 148/70       Intake/Output Summary (Last 24 hours) at 11/8/2024 1804  Last data filed at 11/8/2024 1754  Gross per 24 hour   Intake 5314.4 ml   Output --   Net 5314.4 ml       Physical Exam  Patient is sitting up in bed.  Patient appears comfortable in no distress    Abdomen distended.  Appropriate tenderness.  No peritonitis.  Dressing intact with minimal drainage.  Significant Labs:  All pertinent labs from the last 24 hours have been reviewed.    Significant Diagnostics:  None    Assessment/Plan:     Active Diagnoses:    Diagnosis Date Noted POA    PRINCIPAL PROBLEM:  Small bowel obstruction [K56.609] 11/05/2024 Yes    MVP  (mitral valve prolapse) [I34.1] 11/08/2024 Unknown    Smoker [F17.200] 11/08/2024 Unknown    Supraventricular tachycardia [I47.10] 11/07/2024 No    Neoplasm of cecum [D49.0] 11/05/2024 Yes      Problems Resolved During this Admission:   Patient is status post colon resection for obstructing mass.  Patient doing well.  Diet as tolerated.  Encourage ambulation.  Probable discharge tomorrow.      Ramon Morse MD  General Surgery  [place] - [facility] (3rd Fl)

## 2024-11-10 PROCEDURE — 94761 N-INVAS EAR/PLS OXIMETRY MLT: CPT

## 2024-11-10 PROCEDURE — 25000003 PHARM REV CODE 250: Performed by: INTERNAL MEDICINE

## 2024-11-10 PROCEDURE — 25000003 PHARM REV CODE 250: Performed by: SURGERY

## 2024-11-10 PROCEDURE — 99232 SBSQ HOSP IP/OBS MODERATE 35: CPT | Mod: ,,, | Performed by: INTERNAL MEDICINE

## 2024-11-10 PROCEDURE — 63600175 PHARM REV CODE 636 W HCPCS: Performed by: INTERNAL MEDICINE

## 2024-11-10 PROCEDURE — 99900035 HC TECH TIME PER 15 MIN (STAT)

## 2024-11-10 PROCEDURE — 99900031 HC PATIENT EDUCATION (STAT)

## 2024-11-10 PROCEDURE — 21400001 HC TELEMETRY ROOM

## 2024-11-10 PROCEDURE — 94799 UNLISTED PULMONARY SVC/PX: CPT

## 2024-11-10 RX ADMIN — METOPROLOL TARTRATE 50 MG: 50 TABLET, FILM COATED ORAL at 09:11

## 2024-11-10 RX ADMIN — ENOXAPARIN SODIUM 70 MG: 80 INJECTION SUBCUTANEOUS at 09:11

## 2024-11-10 RX ADMIN — ENOXAPARIN SODIUM 70 MG: 80 INJECTION SUBCUTANEOUS at 08:11

## 2024-11-10 RX ADMIN — PANTOPRAZOLE SODIUM 40 MG: 40 TABLET, DELAYED RELEASE ORAL at 09:11

## 2024-11-10 RX ADMIN — METOPROLOL TARTRATE 50 MG: 50 TABLET, FILM COATED ORAL at 08:11

## 2024-11-10 NOTE — NURSING
HR up to 127, pt observed to be in the bathroom, having a bm. Metoprolol 5 mg, IVP administered HR currently in the 90's. Denies any pain, resting on the bed calm, LAYNE @ this time.

## 2024-11-10 NOTE — NURSING
Dr. Fermin notified of patients HR elevated at 158-180 BPM. Patient reports having a large BM. Orders noted for EKG, CBC, CMP and MAG. Notified by MILI Belcher.

## 2024-11-10 NOTE — PLAN OF CARE
Problem: Adult Inpatient Plan of Care  Goal: Plan of Care Review  11/10/2024 0521 by Ana Maria Santo RN  Outcome: Progressing  11/10/2024 0519 by Ana Maria Santo RN  Outcome: Progressing     Problem: Adult Inpatient Plan of Care  Goal: Patient-Specific Goal (Individualized)  11/10/2024 0521 by Ana Maria Santo RN  Outcome: Progressing  11/10/2024 0519 by Ana Maria Santo RN  Outcome: Progressing     Problem: Adult Inpatient Plan of Care  Goal: Optimal Comfort and Wellbeing  11/10/2024 0521 by Ana Maria Santo RN  Outcome: Progressing  11/10/2024 0519 by Ana Maria Santo RN  Outcome: Progressing     Problem: Wound  Goal: Optimal Coping  Outcome: Progressing     Problem: Wound  Goal: Improved Oral Intake  Outcome: Progressing     Problem: Wound  Goal: Optimal Pain Control and Function  Outcome: Progressing     Plan of care reviewed and followed. Patient progressing well.Converted  to  new AFIB, RVR, a total of metoprolol  10 mg, IVP and 25 mg, po administered during the night, remains in AFIB with controlled HR in the 80's and 90's. Observed to be resting comfortably on the bed, denies any pain, spouse remains @ the bedside. Tolerated medications well. Absence of falls or injury noted throughout shift. Instructed to call for mobility assistance. Call bell in reach, side rails up x2, bed in lowest position. VSS and NADN. Compliant with both scheduled and prn medications. POC ongoing.

## 2024-11-10 NOTE — CONSULTS
[facility] ([address] [place] [address] Medicine  Consult Note    Patient Name: Abdifatah Steele  MRN: [id number]  Admission Date: 11/5/2024  Hospital Length of Stay: 5 days  Attending Physician: Connor Lopez Jr., *   Primary Care Provider: Mychal Valderrama MD           Patient information was obtained from patient and ER records.     Inpatient consult to Internal Medicine  Consult performed by: Stacy Fermin MD  Consult ordered by: Nixon Covington MD        Subjective:     Principal Problem: Small bowel obstruction    Chief Complaint: No chief complaint on file.       HPI: Medicine was consulted by surgery to see this 74-year-old white male status post hemicolectomy he has been having runs of V-tach.  These are all asymptomatic.  Patient denies chest pain, shortness of breath.  He still has an NG tube in place.    Past Medical History:   Diagnosis Date    Fractures        Past Surgical History:   Procedure Laterality Date    COLON RESECTION Right 11/5/2024    Procedure: COLON RESECTION;  Surgeon: Connor Lopez Jr., MD;  Location: [facility] OR;  Service: General;  Laterality: Right;    DIAGNOSTIC LAPAROSCOPY N/A 11/5/2024    Procedure: LAPAROSCOPY, DIAGNOSTIC (ATTEMPTED);  Surgeon: Connor Lopez Jr., MD;  Location: [facility] OR;  Service: General;  Laterality: N/A;    HAND SURGERY      LAPAROTOMY, EXPLORATORY N/A 11/5/2024    Procedure: LAPAROTOMY, EXPLORATORY;  Surgeon: Connor Lopez Jr., MD;  Location: [facility] OR;  Service: General;  Laterality: N/A;    SKULL FRACTURE ELEVATION         Review of patient's allergies indicates:  No Known Allergies    No current facility-administered medications on file prior to encounter.     Current Outpatient Medications on File Prior to Encounter   Medication Sig    carbamazepine (TEGRETOL XR) 100 MG 12 hr tablet Take 100 mg by mouth 2 (two) times daily.      CRESTOR 10 mg tablet      Family History    None       Tobacco Use    Smoking status: Some Days     Current packs/day: 0.50      Types: Cigarettes    Smokeless tobacco: Not on file   Substance and Sexual Activity    Alcohol use: No    Drug use: No    Sexual activity: Not on file     Review of Systems   Constitutional:  Negative for fatigue and fever.   HENT:  Negative for congestion, ear pain, hearing loss, rhinorrhea and sore throat.    Eyes:  Negative for redness and visual disturbance.   Respiratory:  Negative for cough, shortness of breath and wheezing.    Cardiovascular:  Negative for chest pain, palpitations and leg swelling.   Gastrointestinal:  Negative for constipation, diarrhea, nausea and vomiting.   Musculoskeletal:  Negative for back pain, joint swelling and neck pain.   Neurological:  Negative for dizziness, light-headedness and headaches.     Objective:     Vital Signs (Most Recent):  Temp: 97.9 °F (36.6 °C) (11/10/24 0758)  Pulse: 86 (11/10/24 0800)  Resp: 19 (11/10/24 0758)  BP: 127/64 (11/10/24 0758)  SpO2: (!) 94 % (11/10/24 0758) Vital Signs (24h Range):  Temp:  [97.9 °F (36.6 °C)-98.5 °F (36.9 °C)] 97.9 °F (36.6 °C)  Pulse:  [] 86  Resp:  [17-20] 19  SpO2:  [90 %-94 %] 94 %  BP: (111-154)/(64-79) 127/64     Weight: 74 kg (163 lb 2.3 oz)  Body mass index is 23.41 kg/m².     Physical Exam  Vitals and nursing note reviewed.   Constitutional:       General: He is not in acute distress.     Appearance: He is well-developed.   HENT:      Head: Normocephalic and atraumatic.      Right Ear: External ear normal.      Left Ear: External ear normal.   Eyes:      General:         Right eye: No discharge.         Left eye: No discharge.   Neck:      Thyroid: No thyromegaly.   Cardiovascular:      Rate and Rhythm: Normal rate. Rhythm irregular.      Heart sounds: No murmur heard.  Pulmonary:      Effort: Pulmonary effort is normal. No respiratory distress.      Breath sounds: Normal breath sounds.   Abdominal:      General: There is no distension.      Tenderness: There is no abdominal tenderness.   Skin:     General: Skin is  warm and dry.   Neurological:      Mental Status: He is alert and oriented to person, place, and time.   Psychiatric:         Behavior: Behavior normal.         Thought Content: Thought content normal.          Significant Labs: All pertinent labs within the past 24 hours have been reviewed.  CBC:   Recent Labs   Lab 11/09/24 1923   WBC 6.55   HGB 14.1   HCT 41.0        CMP:   Recent Labs   Lab 11/09/24 1923      K 3.6      CO2 25      BUN 7*   CREATININE 0.6   CALCIUM 8.4*   PROT 6.0   ALBUMIN 2.6*   BILITOT 0.4   ALKPHOS 60   AST 11   ALT 10   ANIONGAP 11     Recent Lab Results         11/09/24 1923        Albumin 2.6       ALP 60       ALT 10       Anion Gap 11       AST 11       Baso # 0.02       Basophil % 0.3       BILIRUBIN TOTAL 0.4  Comment: For infants and newborns, interpretation of results should be based  on gestational age, weight and in agreement with clinical  observations.    Premature Infant recommended reference ranges:  Up to 24 hours.............<8.0 mg/dL  Up to 48 hours............<12.0 mg/dL  3-5 days..................<15.0 mg/dL  6-29 days.................<15.0 mg/dL         BUN 7       Calcium 8.4       Chloride 106       CO2 25       Creatinine 0.6       Differential Method Automated       eGFR >60       Eos # 0.2       Eos % 3.7       Glucose 106       Gran # (ANC) 4.9       Gran % 75.4       Hematocrit 41.0       Hemoglobin 14.1       Immature Grans (Abs) 0.02  Comment: Mild elevation in immature granulocytes is non specific and   can be seen in a variety of conditions including stress response,   acute inflammation, trauma and pregnancy. Correlation with other   laboratory and clinical findings is essential.         Immature Granulocytes 0.3       Lymph # 0.8       Lymph % 12.8       Magnesium  2.1       MCH 32.3       MCHC 34.4       MCV 94       Mono # 0.5       Mono % 7.5       MPV 9.6       nRBC 0       Platelet Count 258       Potassium 3.6        PROTEIN TOTAL 6.0       RBC 4.36       RDW 13.4       Sodium 142       TSH 1.325       WBC 6.55               Significant Imaging: I have reviewed all pertinent imaging results/findings within the past 24 hours.  Assessment/Plan:     * Small bowel obstruction  Management per surgery team      Smoker  Smoking cessation recommended      Supraventricular tachycardia  Patient has had several runs of ventricular tachycardia.  His magnesium level was 1.8 so I gave him 2 g of Mag and he has not had any runs of V-tach since.  Continue to monitor on telemetry.    Cardiology consulted    11/10: Medicine reconsulted overnight. Patient had no further SVT on telemetry since initial consult  until last night. A-fib RVR noted with HR sustaining 120s. Treated with IV metoprolol and now on PO metoprolol  HR 70-80s  Remains in a-fib  Treatment dose lovenox started  Will plan to transition to PO Eliquis. CM to assist with pricing to ensure able to obtain at discharge.    If HR remains stable, patient could discharge with metoprolol and eliquis and see cardiology later this week for repeat EKG and discuss antiarrythmic therapy. If remains inpatient overnight cardiology to reassess in AM.   TTE and labs are normal      Neoplasm of cecum  Management per surgery team        VTE Risk Mitigation (From admission, onward)           Ordered     enoxaparin injection 70 mg  Every 12 hours         11/09/24 1934     IP VTE HIGH RISK PATIENT  Once         11/05/24 1413     Place sequential compression device  Until discontinued         11/05/24 1413                        Thank you for your consult. I will follow-up with patient. Please contact us if you have any additional questions.    Stacy Fermin MD  Department of Hospital Medicine   [facility] ([facility])

## 2024-11-10 NOTE — NURSING
HR elevated to the 120' unsustained whenever pt ambulates to the bathroom. Pt is getting agitated whenever nurse goes to the room to check on him whenever HR is observed elevated on tele monitor.

## 2024-11-10 NOTE — NURSING
Dr. Morse here for bedside rounds. ML surgery dressing removed.Staples intact. No S/S of infection.

## 2024-11-10 NOTE — PROGRESS NOTES
Medicine was consulted on this patient 11/7/2024. Patient noted to have SVT. Mg replaced. Arrhythmia resolved. Patient planned for discharge today but developed afib RVR while ambulating to bathroom. EKG done. Labs ordered and pending. TTE 11/8/24 with diastolic dysfunction otherwise normal.     Will give metoprolol for HR control. Note h/o bradycardia during surgery but none since.   Replace lytes PRN  Continue telemetry  Lovenox given

## 2024-11-10 NOTE — ASSESSMENT & PLAN NOTE
Patient has had several runs of ventricular tachycardia.  His magnesium level was 1.8 so I gave him 2 g of Mag and he has not had any runs of V-tach since.  Continue to monitor on telemetry.    Cardiology consulted    11/10: Medicine reconsulted overnight. Patient had no further SVT on telemetry since initial consult  until last night. A-fib RVR noted with HR sustaining 120s. Treated with IV metoprolol and now on PO metoprolol  HR 70-80s  Remains in a-fib  Treatment dose lovenox started  Will plan to transition to PO Eliquis. CM to assist with pricing to ensure able to obtain at discharge.    If HR remains stable, patient could discharge with metoprolol and eliquis and see cardiology later this week for repeat EKG and discuss antiarrythmic therapy. If remains inpatient overnight cardiology to reassess in AM.   TTE and labs are normal

## 2024-11-10 NOTE — PROGRESS NOTES
[facility] (3rd Fl)  General Surgery  Progress Note    Subjective:     Interval History:   Yesterday events discussed with nursing staff.  Patient with AFib with rapid ventricular response.  Patient started on metoprolol and Lovenox transitioning to Eliquis.  Patient denies nausea vomiting.  Patient denies abdominal pain.  Patient has had several bowel movements.  Post-Op Info:  Procedure(s) (LRB):  LAPAROSCOPY, DIAGNOSTIC (ATTEMPTED) (N/A)  LAPAROTOMY, EXPLORATORY (N/A)  COLON RESECTION (Right)   5 Days Post-Op      Medications:  Continuous Infusions:  Scheduled Meds:   docusate sodium  100 mg Oral BID    enoxparin  1 mg/kg Subcutaneous Q12H (treatment, non-standard time)    metoprolol tartrate  50 mg Oral BID    pantoprazole  40 mg Oral Daily     PRN Meds:  Current Facility-Administered Medications:     HYDROcodone-acetaminophen, 1 tablet, Oral, Q6H PRN    metoprolol, 5 mg, Intravenous, Q5 Min PRN    nicotine, 1 patch, Transdermal, Daily PRN    ondansetron, 4 mg, Intravenous, Q12H PRN    traMADoL, 50 mg, Oral, Q4H PRN     Objective:     Vital Signs (Most Recent):  Temp: 97.9 °F (36.6 °C) (11/10/24 0758)  Pulse: 96 (11/10/24 1000)  Resp: 19 (11/10/24 0758)  BP: 127/64 (11/10/24 0758)  SpO2: (!) 94 % (11/10/24 0758) Vital Signs (24h Range):  Temp:  [97.9 °F (36.6 °C)-98.5 °F (36.9 °C)] 97.9 °F (36.6 °C)  Pulse:  [] 96  Resp:  [17-20] 19  SpO2:  [90 %-94 %] 94 %  BP: (111-154)/(64-79) 127/64       Intake/Output Summary (Last 24 hours) at 11/10/2024 1032  Last data filed at 11/10/2024 0815  Gross per 24 hour   Intake 824 ml   Output 300 ml   Net 524 ml       Physical Exam  Patient resting comfortably.  Heart rate normal rate.    Abdomen less distended and soft.  Dressing taken down.  Incision intact with no drainage.  Minimal tenderness.  Significant Labs:  All pertinent labs from the last 24 hours have been reviewed.    Significant Diagnostics:       Assessment/Plan:     Active Diagnoses:    Diagnosis  Date Noted POA    PRINCIPAL PROBLEM:  Small bowel obstruction [K56.609] 11/05/2024 Yes    Smoker [F17.200] 11/08/2024 Yes    Supraventricular tachycardia [I47.10] 11/07/2024 No    Neoplasm of cecum [D49.0] 11/05/2024 Yes      Problems Resolved During this Admission:   Patient tolerating diet.  Discharge when okay with Medicine.      Ramon Mosre MD  General Surgery  [place] - Med Surg (3rd Fl)

## 2024-11-10 NOTE — PLAN OF CARE
Problem: Adult Inpatient Plan of Care  Goal: Plan of Care Review  Outcome: Progressing  Goal: Patient-Specific Goal (Individualized)  Outcome: Progressing  Goal: Absence of Hospital-Acquired Illness or Injury  Outcome: Progressing  Goal: Optimal Comfort and Wellbeing  Outcome: Progressing  Goal: Readiness for Transition of Care  Outcome: Progressing     Problem: Wound  Goal: Optimal Coping  Outcome: Progressing  Goal: Optimal Functional Ability  Outcome: Progressing  Goal: Absence of Infection Signs and Symptoms  Outcome: Progressing  Goal: Improved Oral Intake  Outcome: Progressing  Goal: Optimal Pain Control and Function  Outcome: Progressing  Goal: Skin Health and Integrity  Outcome: Progressing  Goal: Optimal Wound Healing  Outcome: Progressing     Problem: Fall Injury Risk  Goal: Absence of Fall and Fall-Related Injury  Outcome: Progressing     Problem: Pain Acute  Goal: Optimal Pain Control and Function  Outcome: Progressing     Problem: Skin Injury Risk Increased  Goal: Skin Health and Integrity  Outcome: Progressing     Problem: Gas Exchange Impaired  Goal: Optimal Gas Exchange  Outcome: Progressing     Problem: Dysrhythmia  Goal: Normalized Cardiac Rhythm  Outcome: Progressing

## 2024-11-10 NOTE — SUBJECTIVE & OBJECTIVE
Past Medical History:   Diagnosis Date    Fractures        Past Surgical History:   Procedure Laterality Date    COLON RESECTION Right 11/5/2024    Procedure: COLON RESECTION;  Surgeon: Connor Lopez Jr., MD;  Location: [facility];  Service: General;  Laterality: Right;    DIAGNOSTIC LAPAROSCOPY N/A 11/5/2024    Procedure: LAPAROSCOPY, DIAGNOSTIC (ATTEMPTED);  Surgeon: Connor Lopez Jr., MD;  Location: [facility];  Service: General;  Laterality: N/A;    HAND SURGERY      LAPAROTOMY, EXPLORATORY N/A 11/5/2024    Procedure: LAPAROTOMY, EXPLORATORY;  Surgeon: Connor Lopez Jr., MD;  Location: [facility];  Service: General;  Laterality: N/A;    SKULL FRACTURE ELEVATION         Review of patient's allergies indicates:  No Known Allergies    No current facility-administered medications on file prior to encounter.     Current Outpatient Medications on File Prior to Encounter   Medication Sig    carbamazepine (TEGRETOL XR) 100 MG 12 hr tablet Take 100 mg by mouth 2 (two) times daily.      CRESTOR 10 mg tablet      Family History    None       Tobacco Use    Smoking status: Some Days     Current packs/day: 0.50     Types: Cigarettes    Smokeless tobacco: Not on file   Substance and Sexual Activity    Alcohol use: No    Drug use: No    Sexual activity: Not on file     Review of Systems   Constitutional:  Negative for fatigue and fever.   HENT:  Negative for congestion, ear pain, hearing loss, rhinorrhea and sore throat.    Eyes:  Negative for redness and visual disturbance.   Respiratory:  Negative for cough, shortness of breath and wheezing.    Cardiovascular:  Negative for chest pain, palpitations and leg swelling.   Gastrointestinal:  Negative for constipation, diarrhea, nausea and vomiting.   Musculoskeletal:  Negative for back pain, joint swelling and neck pain.   Neurological:  Negative for dizziness, light-headedness and headaches.     Objective:     Vital Signs (Most Recent):  Temp: 97.9 °F (36.6 °C) (11/10/24  0758)  Pulse: 86 (11/10/24 0800)  Resp: 19 (11/10/24 0758)  BP: 127/64 (11/10/24 0758)  SpO2: (!) 94 % (11/10/24 0758) Vital Signs (24h Range):  Temp:  [97.9 °F (36.6 °C)-98.5 °F (36.9 °C)] 97.9 °F (36.6 °C)  Pulse:  [] 86  Resp:  [17-20] 19  SpO2:  [90 %-94 %] 94 %  BP: (111-154)/(64-79) 127/64     Weight: 74 kg (163 lb 2.3 oz)  Body mass index is 23.41 kg/m².     Physical Exam  Vitals and nursing note reviewed.   Constitutional:       General: He is not in acute distress.     Appearance: He is well-developed.   HENT:      Head: Normocephalic and atraumatic.      Right Ear: External ear normal.      Left Ear: External ear normal.   Eyes:      General:         Right eye: No discharge.         Left eye: No discharge.   Neck:      Thyroid: No thyromegaly.   Cardiovascular:      Rate and Rhythm: Normal rate. Rhythm irregular.      Heart sounds: No murmur heard.  Pulmonary:      Effort: Pulmonary effort is normal. No respiratory distress.      Breath sounds: Normal breath sounds.   Abdominal:      General: There is no distension.      Tenderness: There is no abdominal tenderness.   Skin:     General: Skin is warm and dry.   Neurological:      Mental Status: He is alert and oriented to person, place, and time.   Psychiatric:         Behavior: Behavior normal.         Thought Content: Thought content normal.          Significant Labs: All pertinent labs within the past 24 hours have been reviewed.  CBC:   Recent Labs   Lab 11/09/24 1923   WBC 6.55   HGB 14.1   HCT 41.0        CMP:   Recent Labs   Lab 11/09/24 1923      K 3.6      CO2 25      BUN 7*   CREATININE 0.6   CALCIUM 8.4*   PROT 6.0   ALBUMIN 2.6*   BILITOT 0.4   ALKPHOS 60   AST 11   ALT 10   ANIONGAP 11     Recent Lab Results         11/09/24 1923        Albumin 2.6       ALP 60       ALT 10       Anion Gap 11       AST 11       Baso # 0.02       Basophil % 0.3       BILIRUBIN TOTAL 0.4  Comment: For infants and newborns,  interpretation of results should be based  on gestational age, weight and in agreement with clinical  observations.    Premature Infant recommended reference ranges:  Up to 24 hours.............<8.0 mg/dL  Up to 48 hours............<12.0 mg/dL  3-5 days..................<15.0 mg/dL  6-29 days.................<15.0 mg/dL         BUN 7       Calcium 8.4       Chloride 106       CO2 25       Creatinine 0.6       Differential Method Automated       eGFR >60       Eos # 0.2       Eos % 3.7       Glucose 106       Gran # (ANC) 4.9       Gran % 75.4       Hematocrit 41.0       Hemoglobin 14.1       Immature Grans (Abs) 0.02  Comment: Mild elevation in immature granulocytes is non specific and   can be seen in a variety of conditions including stress response,   acute inflammation, trauma and pregnancy. Correlation with other   laboratory and clinical findings is essential.         Immature Granulocytes 0.3       Lymph # 0.8       Lymph % 12.8       Magnesium  2.1       MCH 32.3       MCHC 34.4       MCV 94       Mono # 0.5       Mono % 7.5       MPV 9.6       nRBC 0       Platelet Count 258       Potassium 3.6       PROTEIN TOTAL 6.0       RBC 4.36       RDW 13.4       Sodium 142       TSH 1.325       WBC 6.55               Significant Imaging: I have reviewed all pertinent imaging results/findings within the past 24 hours.

## 2024-11-11 PROBLEM — E87.8 ELECTROLYTE ABNORMALITY: Status: ACTIVE | Noted: 2024-11-11

## 2024-11-11 PROBLEM — I48.91 ATRIAL FIBRILLATION WITH RAPID VENTRICULAR RESPONSE: Status: ACTIVE | Noted: 2024-11-11

## 2024-11-11 LAB
ANION GAP SERPL CALC-SCNC: 12 MMOL/L (ref 8–16)
BASOPHILS # BLD AUTO: 0.02 K/UL (ref 0–0.2)
BASOPHILS NFR BLD: 0.3 % (ref 0–1.9)
BUN SERPL-MCNC: 12 MG/DL (ref 8–23)
CALCIUM SERPL-MCNC: 8.2 MG/DL (ref 8.7–10.5)
CHLORIDE SERPL-SCNC: 106 MMOL/L (ref 95–110)
CO2 SERPL-SCNC: 24 MMOL/L (ref 23–29)
CREAT SERPL-MCNC: 0.7 MG/DL (ref 0.5–1.4)
DIFFERENTIAL METHOD BLD: ABNORMAL
EOSINOPHIL # BLD AUTO: 0.3 K/UL (ref 0–0.5)
EOSINOPHIL NFR BLD: 3.8 % (ref 0–8)
ERYTHROCYTE [DISTWIDTH] IN BLOOD BY AUTOMATED COUNT: 13.5 % (ref 11.5–14.5)
EST. GFR  (NO RACE VARIABLE): >60 ML/MIN/1.73 M^2
GLUCOSE SERPL-MCNC: 103 MG/DL (ref 70–110)
HCT VFR BLD AUTO: 43.3 % (ref 40–54)
HGB BLD-MCNC: 14.8 G/DL (ref 14–18)
IMM GRANULOCYTES # BLD AUTO: 0.03 K/UL (ref 0–0.04)
IMM GRANULOCYTES NFR BLD AUTO: 0.4 % (ref 0–0.5)
LYMPHOCYTES # BLD AUTO: 1 K/UL (ref 1–4.8)
LYMPHOCYTES NFR BLD: 13.6 % (ref 18–48)
MAGNESIUM SERPL-MCNC: 1.9 MG/DL (ref 1.6–2.6)
MCH RBC QN AUTO: 32.2 PG (ref 27–31)
MCHC RBC AUTO-ENTMCNC: 34.2 G/DL (ref 32–36)
MCV RBC AUTO: 94 FL (ref 82–98)
MONOCYTES # BLD AUTO: 0.7 K/UL (ref 0.3–1)
MONOCYTES NFR BLD: 9.8 % (ref 4–15)
NEUTROPHILS # BLD AUTO: 5.3 K/UL (ref 1.8–7.7)
NEUTROPHILS NFR BLD: 72.1 % (ref 38–73)
NRBC BLD-RTO: 0 /100 WBC
OHS QRS DURATION: 124 MS
OHS QTC CALCULATION: 431 MS
PLATELET # BLD AUTO: 267 K/UL (ref 150–450)
PMV BLD AUTO: 9.5 FL (ref 9.2–12.9)
POTASSIUM SERPL-SCNC: 3.4 MMOL/L (ref 3.5–5.1)
RBC # BLD AUTO: 4.6 M/UL (ref 4.6–6.2)
SODIUM SERPL-SCNC: 142 MMOL/L (ref 136–145)
TROPONIN I SERPL DL<=0.01 NG/ML-MCNC: <0.006 NG/ML (ref 0–0.03)
WBC # BLD AUTO: 7.28 K/UL (ref 3.9–12.7)

## 2024-11-11 PROCEDURE — 36415 COLL VENOUS BLD VENIPUNCTURE: CPT | Performed by: INTERNAL MEDICINE

## 2024-11-11 PROCEDURE — 85025 COMPLETE CBC W/AUTO DIFF WBC: CPT | Performed by: INTERNAL MEDICINE

## 2024-11-11 PROCEDURE — 93010 ELECTROCARDIOGRAM REPORT: CPT | Mod: ,,, | Performed by: INTERNAL MEDICINE

## 2024-11-11 PROCEDURE — 25000003 PHARM REV CODE 250: Performed by: INTERNAL MEDICINE

## 2024-11-11 PROCEDURE — 25000003 PHARM REV CODE 250: Performed by: NURSE PRACTITIONER

## 2024-11-11 PROCEDURE — 63600175 PHARM REV CODE 636 W HCPCS: Performed by: INTERNAL MEDICINE

## 2024-11-11 PROCEDURE — 25000003 PHARM REV CODE 250: Performed by: PHYSICIAN ASSISTANT

## 2024-11-11 PROCEDURE — 63600175 PHARM REV CODE 636 W HCPCS: Performed by: PHYSICIAN ASSISTANT

## 2024-11-11 PROCEDURE — 83735 ASSAY OF MAGNESIUM: CPT | Performed by: INTERNAL MEDICINE

## 2024-11-11 PROCEDURE — 93005 ELECTROCARDIOGRAM TRACING: CPT

## 2024-11-11 PROCEDURE — 99900035 HC TECH TIME PER 15 MIN (STAT)

## 2024-11-11 PROCEDURE — 84484 ASSAY OF TROPONIN QUANT: CPT | Performed by: NURSE PRACTITIONER

## 2024-11-11 PROCEDURE — 36415 COLL VENOUS BLD VENIPUNCTURE: CPT | Performed by: NURSE PRACTITIONER

## 2024-11-11 PROCEDURE — 99231 SBSQ HOSP IP/OBS SF/LOW 25: CPT | Mod: ,,, | Performed by: PHYSICIAN ASSISTANT

## 2024-11-11 PROCEDURE — 94761 N-INVAS EAR/PLS OXIMETRY MLT: CPT

## 2024-11-11 PROCEDURE — 21400001 HC TELEMETRY ROOM

## 2024-11-11 PROCEDURE — 25000003 PHARM REV CODE 250: Performed by: SURGERY

## 2024-11-11 PROCEDURE — 80048 BASIC METABOLIC PNL TOTAL CA: CPT | Performed by: INTERNAL MEDICINE

## 2024-11-11 RX ORDER — AMIODARONE HYDROCHLORIDE 200 MG/1
400 TABLET ORAL 2 TIMES DAILY
Status: DISCONTINUED | OUTPATIENT
Start: 2024-11-11 | End: 2024-11-11

## 2024-11-11 RX ORDER — POTASSIUM CHLORIDE 20 MEQ/1
40 TABLET, EXTENDED RELEASE ORAL ONCE
Status: DISCONTINUED | OUTPATIENT
Start: 2024-11-11 | End: 2024-11-11

## 2024-11-11 RX ORDER — MAGNESIUM SULFATE HEPTAHYDRATE 40 MG/ML
2 INJECTION, SOLUTION INTRAVENOUS ONCE
Status: COMPLETED | OUTPATIENT
Start: 2024-11-11 | End: 2024-11-11

## 2024-11-11 RX ORDER — AMIODARONE HYDROCHLORIDE 200 MG/1
400 TABLET ORAL 3 TIMES DAILY
Status: DISCONTINUED | OUTPATIENT
Start: 2024-11-11 | End: 2024-11-13

## 2024-11-11 RX ORDER — METOPROLOL TARTRATE 50 MG/1
50 TABLET ORAL ONCE
Status: COMPLETED | OUTPATIENT
Start: 2024-11-11 | End: 2024-11-11

## 2024-11-11 RX ADMIN — ENOXAPARIN SODIUM 70 MG: 80 INJECTION SUBCUTANEOUS at 08:11

## 2024-11-11 RX ADMIN — MAGNESIUM SULFATE HEPTAHYDRATE 2 G: 40 INJECTION, SOLUTION INTRAVENOUS at 12:11

## 2024-11-11 RX ADMIN — PANTOPRAZOLE SODIUM 40 MG: 40 TABLET, DELAYED RELEASE ORAL at 08:11

## 2024-11-11 RX ADMIN — METOPROLOL TARTRATE 50 MG: 50 TABLET, FILM COATED ORAL at 08:11

## 2024-11-11 RX ADMIN — POTASSIUM BICARBONATE 20 MEQ: 391 TABLET, EFFERVESCENT ORAL at 10:11

## 2024-11-11 RX ADMIN — METOPROLOL TARTRATE 50 MG: 50 TABLET, FILM COATED ORAL at 01:11

## 2024-11-11 RX ADMIN — ENOXAPARIN SODIUM 70 MG: 80 INJECTION SUBCUTANEOUS at 09:11

## 2024-11-11 RX ADMIN — AMIODARONE HYDROCHLORIDE 400 MG: 200 TABLET ORAL at 12:11

## 2024-11-11 RX ADMIN — AMIODARONE HYDROCHLORIDE 400 MG: 200 TABLET ORAL at 08:11

## 2024-11-11 RX ADMIN — METOROPROLOL TARTRATE 5 MG: 5 INJECTION, SOLUTION INTRAVENOUS at 09:11

## 2024-11-11 NOTE — PROGRESS NOTES
No issues reported by nursing staff.  Patient denies abdominal pain.  Patient denies nausea vomiting.  Patient passing flatus and had several bowel movements.  Tolerating diet.    Abdomen nondistended and soft.  No concerning tenderness.  Incision intact with no drainage.    Patient may be discharged from my standpoint when okay with Medicine and Cardiology.

## 2024-11-11 NOTE — SUBJECTIVE & OBJECTIVE
Past Medical History:   Diagnosis Date    Fractures        Past Surgical History:   Procedure Laterality Date    COLON RESECTION Right 11/5/2024    Procedure: COLON RESECTION;  Surgeon: Connor Lopez Jr., MD;  Location: [facility];  Service: General;  Laterality: Right;    DIAGNOSTIC LAPAROSCOPY N/A 11/5/2024    Procedure: LAPAROSCOPY, DIAGNOSTIC (ATTEMPTED);  Surgeon: Connor Lopez Jr., MD;  Location: [facility];  Service: General;  Laterality: N/A;    HAND SURGERY      LAPAROTOMY, EXPLORATORY N/A 11/5/2024    Procedure: LAPAROTOMY, EXPLORATORY;  Surgeon: Connor Lopez Jr., MD;  Location: [facility];  Service: General;  Laterality: N/A;    SKULL FRACTURE ELEVATION         Review of patient's allergies indicates:  No Known Allergies    No current facility-administered medications on file prior to encounter.     Current Outpatient Medications on File Prior to Encounter   Medication Sig    carbamazepine (TEGRETOL XR) 100 MG 12 hr tablet Take 100 mg by mouth 2 (two) times daily.      CRESTOR 10 mg tablet      Family History    None       Tobacco Use    Smoking status: Some Days     Current packs/day: 0.50     Types: Cigarettes    Smokeless tobacco: Not on file   Substance and Sexual Activity    Alcohol use: No    Drug use: No    Sexual activity: Not on file     Review of Systems   Constitutional: Negative.   HENT: Negative.     Eyes: Negative.    Cardiovascular: Negative.    Respiratory: Negative.     Endocrine: Negative.    Hematologic/Lymphatic: Negative.    Skin: Negative.    Musculoskeletal: Negative.    Gastrointestinal: Negative.    Genitourinary: Negative.    Neurological: Negative.    Psychiatric/Behavioral: Negative.     All other systems reviewed and are negative.    Objective:     Vital Signs (Most Recent):  Temp: 98.8 °F (37.1 °C) (11/11/24 0416)  Pulse: 100 (11/11/24 0941)  Resp: 18 (11/11/24 0941)  BP: 132/76 (11/11/24 0941)  SpO2: (!) 92 % (11/11/24 0941) Vital Signs (24h Range):  Temp:  [97.8 °F  (36.6 °C)-98.8 °F (37.1 °C)] 98.8 °F (37.1 °C)  Pulse:  [] 100  Resp:  [18-20] 18  SpO2:  [91 %-96 %] 92 %  BP: (108-139)/(64-83) 132/76     Weight: 74 kg (163 lb 2.3 oz)  Body mass index is 23.41 kg/m².    SpO2: (!) 92 %         Intake/Output Summary (Last 24 hours) at 11/11/2024 0942  Last data filed at 11/11/2024 0916  Gross per 24 hour   Intake 595 ml   Output --   Net 595 ml       Lines/Drains/Airways       Peripheral Intravenous Line  Duration                  Peripheral IV - Single Lumen 11/05/24 1118 22 G 1 in No Left;Posterior Forearm 5 days                     Physical Exam  Constitutional:       General: He is not in acute distress.     Appearance: He is not ill-appearing.   Cardiovascular:      Rate and Rhythm: Regular rhythm. Tachycardia present.      Heart sounds: Murmur heard.      No friction rub. No gallop.   Pulmonary:      Effort: Pulmonary effort is normal.      Breath sounds: Normal breath sounds.   Abdominal:      General: Abdomen is flat.      Palpations: Abdomen is soft.   Musculoskeletal:         General: Normal range of motion.   Skin:     General: Skin is warm and dry.      Capillary Refill: Capillary refill takes less than 2 seconds.   Neurological:      General: No focal deficit present.      Mental Status: He is alert and oriented to person, place, and time. Mental status is at baseline.          Significant Labs:   Recent Lab Results         11/11/24  0447        Anion Gap 12       Baso # 0.02       Basophil % 0.3       BUN 12       Calcium 8.2       Chloride 106       CO2 24       Creatinine 0.7       Differential Method Automated       eGFR >60       Eos # 0.3       Eos % 3.8       Glucose 103       Gran # (ANC) 5.3       Gran % 72.1       Hematocrit 43.3       Hemoglobin 14.8       Immature Grans (Abs) 0.03  Comment: Mild elevation in immature granulocytes is non specific and   can be seen in a variety of conditions including stress response,   acute inflammation, trauma and  pregnancy. Correlation with other   laboratory and clinical findings is essential.         Immature Granulocytes 0.4       Lymph # 1.0       Lymph % 13.6       Magnesium  1.9       MCH 32.2       MCHC 34.2       MCV 94       Mono # 0.7       Mono % 9.8       MPV 9.5       nRBC 0       Platelet Count 267       Potassium 3.4       RBC 4.60       RDW 13.5       Sodium 142       WBC 7.28               Significant Imaging: CT scan: CT ABDOMEN PELVIS WITH CONTRAST: No results found for this visit on 11/05/24. and CT ABDOMEN PELVIS WITHOUT CONTRAST: No results found for this visit on 11/05/24., Echocardiogram: 2D echo with color flow doppler: No results found for this or any previous visit. and Transthoracic echo (TTE) complete (Cupid Only):   Results for orders placed or performed during the hospital encounter of 11/05/24   Echo   Result Value Ref Range    BSA 1.91 m2    LVOT stroke volume 48.0 cm3    LVIDd 4.7 3.5 - 6.0 cm    LV Systolic Volume 27.77 mL    LV Systolic Volume Index 14.5 mL/m2    LVIDs 2.7 2.1 - 4.0 cm    LV ESV A2C 37.12 mL    LV Diastolic Volume 101.92 mL    LV ESV A4C 21.26 mL    LV Diastolic Volume Index 53.36 mL/m2    Left Ventricular End Systolic Volume by Teichholz Method 27.77 mL    Left Ventricular End Diastolic Volume by Teichholz Method 101.92 mL    IVS 0.9 0.6 - 1.1 cm    LVOT diameter 2.0 cm    LVOT area 3.1 cm2    FS 42.6 28 - 44 %    Left Ventricle Relative Wall Thickness 0.34 cm    PW 0.8 0.6 - 1.1 cm    LV mass 132.3 g    LV Mass Index 69.3 g/m2    MV Peak E Pollo 0.54 m/s    TDI LATERAL 0.08 m/s    TDI SEPTAL 0.08 m/s    E/E' ratio 6.75 m/s    MV Peak A Pollo 0.83 m/s    TR Max Pollo 2.99 m/s    E/A ratio 0.65     IVRT 79.92 msec    E wave deceleration time 247.82 msec    LV SEPTAL E/E' RATIO 6.75 m/s    LV LATERAL E/E' RATIO 6.75 m/s    PV Peak S Pollo 0.29 m/s    PV Peak D Pollo 0.36 m/s    Pulm vein S/D ratio 0.81     LVOT peak pollo 0.8 m/s    Left Ventricular Outflow Tract Mean Velocity 0.47  cm/s    Left Ventricular Outflow Tract Mean Gradient 1.03 mmHg    RV S' 10.03 cm/s    TAPSE 2.54 cm    LA size 3.01 cm    Left Atrium Major Axis 4.68 cm    LA Vol (MOD) 29.41 mL    COREY (MOD) 15.4 mL/m2    RA Major Axis 5.06 cm    AV mean gradient 2.4 mmHg    AV peak gradient 5.8 mmHg    Ao peak drea 1.2 m/s    Ao VTI 21.0 cm    LVOT peak VTI 15.3 cm    AV valve area 2.3 cm²    AV Velocity Ratio 0.67     AV index (prosthetic) 0.73     FADI by Velocity Ratio 2.1 cm²    MV stenosis pressure 1/2 time 60.82 ms    MV valve area p 1/2 method 3.62 cm2    Triscuspid Valve Regurgitation Peak Gradient 36 mmHg    PV PEAK VELOCITY 0.75 m/s    PV peak gradient 2 mmHg    Pulmonary Valve Mean Velocity 0.46 m/s    Mean e' 0.08 m/s    ZLVIDS -1.61     ZLVIDD -1.34     LA area A4C 11.55 cm2    LA area A2C 14.09 cm2    RVDD 2.25 cm    TV resting pulmonary artery pressure 39 mmHg    RV TB RVSP 6 mmHg    Est. RA pres 3 mmHg    Narrative      Left Ventricle: The left ventricle is normal in size. Normal wall   thickness. There is normal systolic function with a visually estimated   ejection fraction of 60 - 65%. Grade I diastolic dysfunction.    Right Ventricle: Normal right ventricular cavity size. Systolic   function is normal.    Aortic Valve: The aortic valve is a trileaflet valve. There is aortic   valve sclerosis.    Tricuspid Valve: There is moderate regurgitation.    Pulmonary Artery: The estimated pulmonary artery systolic pressure is   39 mmHg.     , and X-Ray: CXR: X-Ray Chest 1 View (CXR): No results found for this visit on 11/05/24. and X-Ray Chest PA and Lateral (CXR): No results found for this visit on 11/05/24. and KUB: X-Ray Abdomen AP 1 View (KUB): No results found for this visit on 11/05/24.      Current Facility-Administered Medications   Medication    docusate sodium capsule 100 mg    enoxaparin injection 70 mg    HYDROcodone-acetaminophen 7.5-325 mg per tablet 1 tablet    metoprolol tartrate (LOPRESSOR) tablet 50 mg     nicotine 21 mg/24 hr 1 patch    ondansetron injection 4 mg    pantoprazole EC tablet 40 mg    potassium bicarbonate disintegrating tablet 20 mEq    traMADoL tablet 50 mg

## 2024-11-11 NOTE — SUBJECTIVE & OBJECTIVE
Past Medical History:   Diagnosis Date    Fractures        Past Surgical History:   Procedure Laterality Date    COLON RESECTION Right 11/5/2024    Procedure: COLON RESECTION;  Surgeon: Connor Lopez Jr., MD;  Location: [facility];  Service: General;  Laterality: Right;    DIAGNOSTIC LAPAROSCOPY N/A 11/5/2024    Procedure: LAPAROSCOPY, DIAGNOSTIC (ATTEMPTED);  Surgeon: Connor Lopez Jr., MD;  Location: [facility];  Service: General;  Laterality: N/A;    HAND SURGERY      LAPAROTOMY, EXPLORATORY N/A 11/5/2024    Procedure: LAPAROTOMY, EXPLORATORY;  Surgeon: Connor Lopez Jr., MD;  Location: [facility];  Service: General;  Laterality: N/A;    SKULL FRACTURE ELEVATION         Review of patient's allergies indicates:  No Known Allergies    No current facility-administered medications on file prior to encounter.     Current Outpatient Medications on File Prior to Encounter   Medication Sig    carbamazepine (TEGRETOL XR) 100 MG 12 hr tablet Take 100 mg by mouth 2 (two) times daily.      CRESTOR 10 mg tablet      Family History    None       Tobacco Use    Smoking status: Some Days     Current packs/day: 0.50     Types: Cigarettes    Smokeless tobacco: Not on file   Substance and Sexual Activity    Alcohol use: No    Drug use: No    Sexual activity: Not on file     Review of Systems   Constitutional:  Negative for fatigue and fever.   HENT:  Negative for congestion, ear pain, hearing loss, rhinorrhea and sore throat.    Eyes:  Negative for redness and visual disturbance.   Respiratory:  Negative for cough, shortness of breath and wheezing.    Cardiovascular:  Negative for chest pain, palpitations and leg swelling.   Gastrointestinal:  Negative for constipation, diarrhea, nausea and vomiting.   Musculoskeletal:  Negative for back pain, joint swelling and neck pain.   Neurological:  Negative for dizziness, light-headedness and headaches.     Objective:     Vital Signs (Most Recent):  Temp: 98.8 °F (37.1 °C) (11/11/24  0416)  Pulse: 101 (11/11/24 1029)  Resp: 18 (11/11/24 1029)  BP: 136/73 (11/11/24 1029)  SpO2: (!) 94 % (11/11/24 1029) Vital Signs (24h Range):  Temp:  [97.8 °F (36.6 °C)-98.8 °F (37.1 °C)] 98.8 °F (37.1 °C)  Pulse:  [] 101  Resp:  [18-20] 18  SpO2:  [91 %-96 %] 94 %  BP: (108-139)/(64-83) 136/73     Weight: 74 kg (163 lb 2.3 oz)  Body mass index is 23.41 kg/m².     Physical Exam  Vitals and nursing note reviewed.   Constitutional:       General: He is not in acute distress.     Appearance: He is well-developed.   HENT:      Head: Normocephalic and atraumatic.      Right Ear: External ear normal.      Left Ear: External ear normal.   Eyes:      General:         Right eye: No discharge.         Left eye: No discharge.   Neck:      Thyroid: No thyromegaly.   Cardiovascular:      Rate and Rhythm: Normal rate. Rhythm irregular.      Heart sounds: No murmur heard.  Pulmonary:      Effort: Pulmonary effort is normal. No respiratory distress.      Breath sounds: Normal breath sounds.   Abdominal:      General: There is no distension.      Tenderness: There is no abdominal tenderness.   Skin:     General: Skin is warm and dry.   Neurological:      Mental Status: He is alert and oriented to person, place, and time.   Psychiatric:         Behavior: Behavior normal.         Thought Content: Thought content normal.          Significant Labs: All pertinent labs within the past 24 hours have been reviewed.  CBC:   Recent Labs   Lab 11/09/24 1923 11/11/24 0447   WBC 6.55 7.28   HGB 14.1 14.8   HCT 41.0 43.3    267     CMP:   Recent Labs   Lab 11/09/24 1923 11/11/24 0447    142   K 3.6 3.4*    106   CO2 25 24    103   BUN 7* 12   CREATININE 0.6 0.7   CALCIUM 8.4* 8.2*   PROT 6.0  --    ALBUMIN 2.6*  --    BILITOT 0.4  --    ALKPHOS 60  --    AST 11  --    ALT 10  --    ANIONGAP 11 12     Recent Lab Results         11/11/24  0447        Anion Gap 12       Baso # 0.02       Basophil % 0.3        BUN 12       Calcium 8.2       Chloride 106       CO2 24       Creatinine 0.7       Differential Method Automated       eGFR >60       Eos # 0.3       Eos % 3.8       Glucose 103       Gran # (ANC) 5.3       Gran % 72.1       Hematocrit 43.3       Hemoglobin 14.8       Immature Grans (Abs) 0.03  Comment: Mild elevation in immature granulocytes is non specific and   can be seen in a variety of conditions including stress response,   acute inflammation, trauma and pregnancy. Correlation with other   laboratory and clinical findings is essential.         Immature Granulocytes 0.4       Lymph # 1.0       Lymph % 13.6       Magnesium  1.9       MCH 32.2       MCHC 34.2       MCV 94       Mono # 0.7       Mono % 9.8       MPV 9.5       nRBC 0       Platelet Count 267       Potassium 3.4       RBC 4.60       RDW 13.5       Sodium 142       WBC 7.28               Significant Imaging: I have reviewed all pertinent imaging results/findings within the past 24 hours.   stable

## 2024-11-11 NOTE — ASSESSMENT & PLAN NOTE
Patient has paroxysmal (<7 days) atrial fibrillation. Patient is currently in atrial fibrillation. [id number] Score: 2. The patients heart rate in the last 24 hours is as follows:  Pulse  Min: 71  Max: 113     Antiarrhythmics  metoprolol tartrate (LOPRESSOR) tablet 50 mg, 2 times daily, Oral    Anticoagulants  enoxaparin injection 70 mg, Every 12 hours, Subcutaneous  apixaban tablet, 2 times daily, Oral    Plan  - Replete lytes with a goal of K>4, Mg >2  - Patient is anticoagulated, see medications listed above.  - Patient's afib is currently controlled    Cardiology recommendations pending

## 2024-11-11 NOTE — CONSULTS
Sw spoke with patient and patient's spouse about Goals of Care and advance directives. Patient only wanted to take informational booklets. Patient will remain a full code at this time.

## 2024-11-11 NOTE — CONSULTS
[place] - [facility] S[facility] (3rd Fl)  Cardiology  Consult Note    Patient Name: Abdifatah Steele  MRN: [id number]  Admission Date: 11/5/2024  Hospital Length of Stay: 6 days  Code Status: No Order   Attending Provider: Connor Lopez Jr., *   Consulting Provider: Valentino Acuna NP  Primary Care Physician: Mychal Valderrama MD  Principal Problem:Small bowel obstruction    Patient information was obtained from patient and ER records.     Consults  Subjective:     Chief Complaint:  palpitations     HPI:   74-year-old with past medical history of SVT, currently post hemicolectomy. CIS consulted for new onset atrial fibrillation with RVR.     Past Medical History:   Diagnosis Date    Fractures        Past Surgical History:   Procedure Laterality Date    COLON RESECTION Right 11/5/2024    Procedure: COLON RESECTION;  Surgeon: Connor Lopez Jr., MD;  Location: [facility] OR;  Service: General;  Laterality: Right;    DIAGNOSTIC LAPAROSCOPY N/A 11/5/2024    Procedure: LAPAROSCOPY, DIAGNOSTIC (ATTEMPTED);  Surgeon: Connor Lopez Jr., MD;  Location: [facility] OR;  Service: General;  Laterality: N/A;    HAND SURGERY      LAPAROTOMY, EXPLORATORY N/A 11/5/2024    Procedure: LAPAROTOMY, EXPLORATORY;  Surgeon: Connor Lopez Jr., MD;  Location: [facility] OR;  Service: General;  Laterality: N/A;    SKULL FRACTURE ELEVATION         Review of patient's allergies indicates:  No Known Allergies    No current facility-administered medications on file prior to encounter.     Current Outpatient Medications on File Prior to Encounter   Medication Sig    carbamazepine (TEGRETOL XR) 100 MG 12 hr tablet Take 100 mg by mouth 2 (two) times daily.      CRESTOR 10 mg tablet      Family History    None       Tobacco Use    Smoking status: Some Days     Current packs/day: 0.50     Types: Cigarettes    Smokeless tobacco: Not on file   Substance and Sexual Activity    Alcohol use: No    Drug use: No    Sexual activity: Not on file     Review of Systems    Constitutional: Negative.   HENT: Negative.     Eyes: Negative.    Cardiovascular: Negative.    Respiratory: Negative.     Endocrine: Negative.    Hematologic/Lymphatic: Negative.    Skin: Negative.    Musculoskeletal: Negative.    Gastrointestinal: Negative.    Genitourinary: Negative.    Neurological: Negative.    Psychiatric/Behavioral: Negative.     All other systems reviewed and are negative.    Objective:     Vital Signs (Most Recent):  Temp: 98.8 °F (37.1 °C) (11/11/24 0416)  Pulse: 100 (11/11/24 0941)  Resp: 18 (11/11/24 0941)  BP: 132/76 (11/11/24 0941)  SpO2: (!) 92 % (11/11/24 0941) Vital Signs (24h Range):  Temp:  [97.8 °F (36.6 °C)-98.8 °F (37.1 °C)] 98.8 °F (37.1 °C)  Pulse:  [] 100  Resp:  [18-20] 18  SpO2:  [91 %-96 %] 92 %  BP: (108-139)/(64-83) 132/76     Weight: 74 kg (163 lb 2.3 oz)  Body mass index is 23.41 kg/m².    SpO2: (!) 92 %         Intake/Output Summary (Last 24 hours) at 11/11/2024 0942  Last data filed at 11/11/2024 0916  Gross per 24 hour   Intake 595 ml   Output --   Net 595 ml       Lines/Drains/Airways       Peripheral Intravenous Line  Duration                  Peripheral IV - Single Lumen 11/05/24 1118 22 G 1 in No Left;Posterior Forearm 5 days                     Physical Exam  Constitutional:       General: He is not in acute distress.     Appearance: He is not ill-appearing.   Cardiovascular:      Rate and Rhythm: Regular rhythm. Tachycardia present.      Heart sounds: Murmur heard.      No friction rub. No gallop.   Pulmonary:      Effort: Pulmonary effort is normal.      Breath sounds: Normal breath sounds.   Abdominal:      General: Abdomen is flat.      Palpations: Abdomen is soft.   Musculoskeletal:         General: Normal range of motion.   Skin:     General: Skin is warm and dry.      Capillary Refill: Capillary refill takes less than 2 seconds.   Neurological:      General: No focal deficit present.      Mental Status: He is alert and oriented to person, place,  and time. Mental status is at baseline.          Significant Labs:   Recent Lab Results         11/11/24  0447        Anion Gap 12       Baso # 0.02       Basophil % 0.3       BUN 12       Calcium 8.2       Chloride 106       CO2 24       Creatinine 0.7       Differential Method Automated       eGFR >60       Eos # 0.3       Eos % 3.8       Glucose 103       Gran # (ANC) 5.3       Gran % 72.1       Hematocrit 43.3       Hemoglobin 14.8       Immature Grans (Abs) 0.03  Comment: Mild elevation in immature granulocytes is non specific and   can be seen in a variety of conditions including stress response,   acute inflammation, trauma and pregnancy. Correlation with other   laboratory and clinical findings is essential.         Immature Granulocytes 0.4       Lymph # 1.0       Lymph % 13.6       Magnesium  1.9       MCH 32.2       MCHC 34.2       MCV 94       Mono # 0.7       Mono % 9.8       MPV 9.5       nRBC 0       Platelet Count 267       Potassium 3.4       RBC 4.60       RDW 13.5       Sodium 142       WBC 7.28               Significant Imaging: CT scan: CT ABDOMEN PELVIS WITH CONTRAST: No results found for this visit on 11/05/24. and CT ABDOMEN PELVIS WITHOUT CONTRAST: No results found for this visit on 11/05/24., Echocardiogram: 2D echo with color flow doppler: No results found for this or any previous visit. and Transthoracic echo (TTE) complete (Cupid Only):   Results for orders placed or performed during the hospital encounter of 11/05/24   Echo   Result Value Ref Range    BSA 1.91 m2    LVOT stroke volume 48.0 cm3    LVIDd 4.7 3.5 - 6.0 cm    LV Systolic Volume 27.77 mL    LV Systolic Volume Index 14.5 mL/m2    LVIDs 2.7 2.1 - 4.0 cm    LV ESV A2C 37.12 mL    LV Diastolic Volume 101.92 mL    LV ESV A4C 21.26 mL    LV Diastolic Volume Index 53.36 mL/m2    Left Ventricular End Systolic Volume by Teichholz Method 27.77 mL    Left Ventricular End Diastolic Volume by Teichholz Method 101.92 mL    IVS 0.9 0.6 -  1.1 cm    LVOT diameter 2.0 cm    LVOT area 3.1 cm2    FS 42.6 28 - 44 %    Left Ventricle Relative Wall Thickness 0.34 cm    PW 0.8 0.6 - 1.1 cm    LV mass 132.3 g    LV Mass Index 69.3 g/m2    MV Peak E Pollo 0.54 m/s    TDI LATERAL 0.08 m/s    TDI SEPTAL 0.08 m/s    E/E' ratio 6.75 m/s    MV Peak A Pollo 0.83 m/s    TR Max Pollo 2.99 m/s    E/A ratio 0.65     IVRT 79.92 msec    E wave deceleration time 247.82 msec    LV SEPTAL E/E' RATIO 6.75 m/s    LV LATERAL E/E' RATIO 6.75 m/s    PV Peak S Pollo 0.29 m/s    PV Peak D Pollo 0.36 m/s    Pulm vein S/D ratio 0.81     LVOT peak pollo 0.8 m/s    Left Ventricular Outflow Tract Mean Velocity 0.47 cm/s    Left Ventricular Outflow Tract Mean Gradient 1.03 mmHg    RV S' 10.03 cm/s    TAPSE 2.54 cm    LA size 3.01 cm    Left Atrium Major Axis 4.68 cm    LA Vol (MOD) 29.41 mL    COREY (MOD) 15.4 mL/m2    RA Major Axis 5.06 cm    AV mean gradient 2.4 mmHg    AV peak gradient 5.8 mmHg    Ao peak pollo 1.2 m/s    Ao VTI 21.0 cm    LVOT peak VTI 15.3 cm    AV valve area 2.3 cm²    AV Velocity Ratio 0.67     AV index (prosthetic) 0.73     FADI by Velocity Ratio 2.1 cm²    MV stenosis pressure 1/2 time 60.82 ms    MV valve area p 1/2 method 3.62 cm2    Triscuspid Valve Regurgitation Peak Gradient 36 mmHg    PV PEAK VELOCITY 0.75 m/s    PV peak gradient 2 mmHg    Pulmonary Valve Mean Velocity 0.46 m/s    Mean e' 0.08 m/s    ZLVIDS -1.61     ZLVIDD -1.34     LA area A4C 11.55 cm2    LA area A2C 14.09 cm2    RVDD 2.25 cm    TV resting pulmonary artery pressure 39 mmHg    RV TB RVSP 6 mmHg    Est. RA pres 3 mmHg    Narrative      Left Ventricle: The left ventricle is normal in size. Normal wall   thickness. There is normal systolic function with a visually estimated   ejection fraction of 60 - 65%. Grade I diastolic dysfunction.    Right Ventricle: Normal right ventricular cavity size. Systolic   function is normal.    Aortic Valve: The aortic valve is a trileaflet valve. There is aortic   valve  sclerosis.    Tricuspid Valve: There is moderate regurgitation.    Pulmonary Artery: The estimated pulmonary artery systolic pressure is   39 mmHg.     , and X-Ray: CXR: X-Ray Chest 1 View (CXR): No results found for this visit on 11/05/24. and X-Ray Chest PA and Lateral (CXR): No results found for this visit on 11/05/24. and KUB: X-Ray Abdomen AP 1 View (KUB): No results found for this visit on 11/05/24.      Current Facility-Administered Medications   Medication    docusate sodium capsule 100 mg    enoxaparin injection 70 mg    HYDROcodone-acetaminophen 7.5-325 mg per tablet 1 tablet    metoprolol tartrate (LOPRESSOR) tablet 50 mg    nicotine 21 mg/24 hr 1 patch    ondansetron injection 4 mg    pantoprazole EC tablet 40 mg    potassium bicarbonate disintegrating tablet 20 mEq    traMADoL tablet 50 mg      Assessment and Plan:     Supraventricular tachycardia  30 beat run per tele monitor; received magnesium  Asymptomatic  Colon surgery 11/5/2024 and severe bradycardia during procedure, heart rate 30s  No further reoccurrence    Check echocardiogram to assess LV function and valvular structure  Case discussed with Dr. Linda  We will attempt to get Lexiscan in a.m. if not, outpatient  Continue to monitor on telemetry        VTE Risk Mitigation (From admission, onward)           Ordered     enoxaparin injection 70 mg  Every 12 hours         11/09/24 1934     IP VTE HIGH RISK PATIENT  Once         11/05/24 1413     Place sequential compression device  Until discontinued         11/05/24 1413                  Echocardiogram 11/2024: LVEF 60%-65%, Grade 1 LVDD, 2+ TR. PASP 39 mmHg      DX: New onset afib w/ RVR  SVT  SBO, s/p hemicolectomy last wk 11/24, now almost ready for DC home  Neoplasm of cecum   LVEF 60% 11/24      Plan: supplement Mag and K  Continue Metoprolol 50 bid and anticoagulation (note pt was on apixaban for ?, and crestor at home )  Add amiodarone  for now  If still in AF tomorrow; consider GIFTY  CV      Thank you for your consult. I will follow-up with patient. Please contact us if you have any additional questions.   Transcribed by  Valentino Acuna NP for Dr MERVIN Russo  Cardiology   [place] - [facility] Surg (3rd Fl)  I attest that I have personally seen and examined this patient. I have reviewed and discussed the management in detail as outlined above.

## 2024-11-12 ENCOUNTER — ANESTHESIA EVENT (OUTPATIENT)
Dept: SURGERY | Facility: HOSPITAL | Age: 74
DRG: 330 | End: 2024-11-12
Payer: MEDICARE

## 2024-11-12 ENCOUNTER — ANESTHESIA (OUTPATIENT)
Dept: SURGERY | Facility: HOSPITAL | Age: 74
DRG: 330 | End: 2024-11-12
Payer: MEDICARE

## 2024-11-12 LAB
ANION GAP SERPL CALC-SCNC: 10 MMOL/L (ref 8–16)
BSA FOR ECHO PROCEDURE: 1.91 M2
BUN SERPL-MCNC: 12 MG/DL (ref 8–23)
CALCIUM SERPL-MCNC: 8.2 MG/DL (ref 8.7–10.5)
CHLORIDE SERPL-SCNC: 107 MMOL/L (ref 95–110)
CO2 SERPL-SCNC: 25 MMOL/L (ref 23–29)
CREAT SERPL-MCNC: 0.7 MG/DL (ref 0.5–1.4)
EST. GFR  (NO RACE VARIABLE): >60 ML/MIN/1.73 M^2
GLUCOSE SERPL-MCNC: 105 MG/DL (ref 70–110)
MAGNESIUM SERPL-MCNC: 2 MG/DL (ref 1.6–2.6)
OHS QRS DURATION: 124 MS
OHS QTC CALCULATION: 477 MS
POTASSIUM SERPL-SCNC: 3.4 MMOL/L (ref 3.5–5.1)
SODIUM SERPL-SCNC: 142 MMOL/L (ref 136–145)

## 2024-11-12 PROCEDURE — 36000706: Performed by: INTERNAL MEDICINE

## 2024-11-12 PROCEDURE — 63600175 PHARM REV CODE 636 W HCPCS: Performed by: INTERNAL MEDICINE

## 2024-11-12 PROCEDURE — 99232 SBSQ HOSP IP/OBS MODERATE 35: CPT | Mod: ,,, | Performed by: INTERNAL MEDICINE

## 2024-11-12 PROCEDURE — 36415 COLL VENOUS BLD VENIPUNCTURE: CPT | Performed by: PHYSICIAN ASSISTANT

## 2024-11-12 PROCEDURE — 94761 N-INVAS EAR/PLS OXIMETRY MLT: CPT

## 2024-11-12 PROCEDURE — 36000707: Performed by: INTERNAL MEDICINE

## 2024-11-12 PROCEDURE — 92960 CARDIOVERSION ELECTRIC EXT: CPT | Performed by: INTERNAL MEDICINE

## 2024-11-12 PROCEDURE — 21400001 HC TELEMETRY ROOM

## 2024-11-12 PROCEDURE — 93005 ELECTROCARDIOGRAM TRACING: CPT

## 2024-11-12 PROCEDURE — 99900035 HC TECH TIME PER 15 MIN (STAT)

## 2024-11-12 PROCEDURE — 37000009 HC ANESTHESIA EA ADD 15 MINS: Performed by: INTERNAL MEDICINE

## 2024-11-12 PROCEDURE — 80048 BASIC METABOLIC PNL TOTAL CA: CPT | Performed by: PHYSICIAN ASSISTANT

## 2024-11-12 PROCEDURE — 63600175 PHARM REV CODE 636 W HCPCS: Performed by: NURSE ANESTHETIST, CERTIFIED REGISTERED

## 2024-11-12 PROCEDURE — 37000008 HC ANESTHESIA 1ST 15 MINUTES: Performed by: INTERNAL MEDICINE

## 2024-11-12 PROCEDURE — 25000003 PHARM REV CODE 250: Performed by: NURSE PRACTITIONER

## 2024-11-12 PROCEDURE — 83735 ASSAY OF MAGNESIUM: CPT | Performed by: PHYSICIAN ASSISTANT

## 2024-11-12 PROCEDURE — 71000033 HC RECOVERY, INTIAL HOUR: Performed by: INTERNAL MEDICINE

## 2024-11-12 PROCEDURE — 25000003 PHARM REV CODE 250: Performed by: INTERNAL MEDICINE

## 2024-11-12 RX ORDER — PROPOFOL 10 MG/ML
VIAL (ML) INTRAVENOUS
Status: DISCONTINUED | OUTPATIENT
Start: 2024-11-12 | End: 2024-11-12

## 2024-11-12 RX ORDER — POTASSIUM CHLORIDE 7.45 MG/ML
10 INJECTION INTRAVENOUS
Status: COMPLETED | OUTPATIENT
Start: 2024-11-12 | End: 2024-11-12

## 2024-11-12 RX ORDER — TALC
6 POWDER (GRAM) TOPICAL NIGHTLY PRN
Status: DISCONTINUED | OUTPATIENT
Start: 2024-11-12 | End: 2024-11-13 | Stop reason: HOSPADM

## 2024-11-12 RX ORDER — LIDOCAINE HYDROCHLORIDE 20 MG/ML
INJECTION, SOLUTION EPIDURAL; INFILTRATION; INTRACAUDAL; PERINEURAL
Status: DISCONTINUED | OUTPATIENT
Start: 2024-11-12 | End: 2024-11-12

## 2024-11-12 RX ADMIN — POTASSIUM CHLORIDE 10 MEQ: 7.46 INJECTION, SOLUTION INTRAVENOUS at 08:11

## 2024-11-12 RX ADMIN — AMIODARONE HYDROCHLORIDE 400 MG: 200 TABLET ORAL at 07:11

## 2024-11-12 RX ADMIN — POTASSIUM CHLORIDE 10 MEQ: 7.46 INJECTION, SOLUTION INTRAVENOUS at 07:11

## 2024-11-12 RX ADMIN — ENOXAPARIN SODIUM 70 MG: 80 INJECTION SUBCUTANEOUS at 08:11

## 2024-11-12 RX ADMIN — METOPROLOL TARTRATE 50 MG: 50 TABLET, FILM COATED ORAL at 07:11

## 2024-11-12 RX ADMIN — PROPOFOL 100 MG: 10 INJECTION, EMULSION INTRAVENOUS at 12:11

## 2024-11-12 RX ADMIN — PROPOFOL 50 MG: 10 INJECTION, EMULSION INTRAVENOUS at 12:11

## 2024-11-12 RX ADMIN — LIDOCAINE HYDROCHLORIDE 5 MG: 20 INJECTION, SOLUTION EPIDURAL; INFILTRATION; INTRACAUDAL; PERINEURAL at 12:11

## 2024-11-12 RX ADMIN — POTASSIUM CHLORIDE 10 MEQ: 7.46 INJECTION, SOLUTION INTRAVENOUS at 09:11

## 2024-11-12 RX ADMIN — AMIODARONE HYDROCHLORIDE 400 MG: 200 TABLET ORAL at 08:11

## 2024-11-12 RX ADMIN — Medication 6 MG: at 08:11

## 2024-11-12 RX ADMIN — AMIODARONE HYDROCHLORIDE 400 MG: 200 TABLET ORAL at 02:11

## 2024-11-12 RX ADMIN — METOPROLOL TARTRATE 50 MG: 50 TABLET, FILM COATED ORAL at 08:11

## 2024-11-12 RX ADMIN — POTASSIUM CHLORIDE 10 MEQ: 7.46 INJECTION, SOLUTION INTRAVENOUS at 02:11

## 2024-11-12 NOTE — NURSING
RECEIVED PATIENT FROM SX; RECEIVED REPORT FROM MILI BRAMBILA. PATIENT IN STABLE CONDITION; V/S CHARTED; PATIENT NOW IN NSR; HR 52; DENIES ANY NEEDS OR COMPLAINTS AT PRESENT TIME; WIFE AT BEDSIDE. WILL CONTINUE TO MONITOR.

## 2024-11-12 NOTE — PLAN OF CARE
Patient NPO since midnight for GIFTY some time today.    Problem: Adult Inpatient Plan of Care  Goal: Plan of Care Review  Outcome: Progressing  Flowsheets (Taken 11/12/2024 7457)  Plan of Care Reviewed With:   patient   spouse  Goal: Absence of Hospital-Acquired Illness or Injury  Outcome: Progressing  Goal: Optimal Comfort and Wellbeing  Outcome: Progressing  Goal: Readiness for Transition of Care  Outcome: Progressing

## 2024-11-12 NOTE — NURSING
SX ON FLOOR TO TRANSPORT PATIENT TO HAVE GIFTY WITH POSSIBLE CARDIOVERSION.  POTASSIUM INFUSING VIA PUMP; POTASSIUM TRANSPORTED WITH PATIENT TO SX. MILI DALLAS TO TRANSPORT PATIENT TO SX. WIFE AT BEDSIDE.

## 2024-11-12 NOTE — SUBJECTIVE & OBJECTIVE
Past Medical History:   Diagnosis Date    Fractures        Past Surgical History:   Procedure Laterality Date    COLON RESECTION Right 11/5/2024    Procedure: COLON RESECTION;  Surgeon: Connor Lopez Jr., MD;  Location: [facility] OR;  Service: General;  Laterality: Right;    DIAGNOSTIC LAPAROSCOPY N/A 11/5/2024    Procedure: LAPAROSCOPY, DIAGNOSTIC (ATTEMPTED);  Surgeon: Connor Lopez Jr., MD;  Location: [facility] OR;  Service: General;  Laterality: N/A;    HAND SURGERY      LAPAROTOMY, EXPLORATORY N/A 11/5/2024    Procedure: LAPAROTOMY, EXPLORATORY;  Surgeon: Connor Lopez Jr., MD;  Location: [facility] OR;  Service: General;  Laterality: N/A;    SKULL FRACTURE ELEVATION         Review of patient's allergies indicates:  No Known Allergies    No current facility-administered medications on file prior to encounter.     Current Outpatient Medications on File Prior to Encounter   Medication Sig    carbamazepine (TEGRETOL XR) 100 MG 12 hr tablet Take 100 mg by mouth 2 (two) times daily.      CRESTOR 10 mg tablet      Family History    None       Tobacco Use    Smoking status: Some Days     Current packs/day: 0.50     Types: Cigarettes    Smokeless tobacco: Not on file   Substance and Sexual Activity    Alcohol use: No    Drug use: No    Sexual activity: Not on file     Review of Systems   Constitutional:  Negative for fatigue and fever.   HENT:  Negative for congestion, ear pain, hearing loss, rhinorrhea and sore throat.    Eyes:  Negative for redness and visual disturbance.   Respiratory:  Negative for cough, shortness of breath and wheezing.    Cardiovascular:  Negative for chest pain, palpitations and leg swelling.   Gastrointestinal:  Negative for constipation, diarrhea, nausea and vomiting.   Musculoskeletal:  Negative for back pain, joint swelling and neck pain.   Neurological:  Negative for dizziness, light-headedness and headaches.     Objective:     Vital Signs (Most Recent):  Temp: 98.2 °F (36.8 °C) (11/12/24  0725)  Pulse: 81 (11/12/24 0800)  Resp: 16 (11/12/24 0725)  BP: (!) 141/80 (11/12/24 0725)  SpO2: (!) 93 % (11/12/24 0748) Vital Signs (24h Range):  Temp:  [97.8 °F (36.6 °C)-98.9 °F (37.2 °C)] 98.2 °F (36.8 °C)  Pulse:  [] 81  Resp:  [14-20] 16  SpO2:  [92 %-95 %] 93 %  BP: (121-141)/(71-88) 141/80     Weight: 74 kg (163 lb 2.3 oz)  Body mass index is 23.41 kg/m².     Physical Exam  Vitals and nursing note reviewed.   Constitutional:       General: He is not in acute distress.     Appearance: He is well-developed.   HENT:      Head: Normocephalic and atraumatic.      Right Ear: External ear normal.      Left Ear: External ear normal.   Eyes:      General:         Right eye: No discharge.         Left eye: No discharge.   Neck:      Thyroid: No thyromegaly.   Cardiovascular:      Rate and Rhythm: Normal rate. Rhythm irregular.      Heart sounds: No murmur heard.  Pulmonary:      Effort: Pulmonary effort is normal. No respiratory distress.      Breath sounds: Normal breath sounds.   Abdominal:      General: There is no distension.      Tenderness: There is no abdominal tenderness.   Skin:     General: Skin is warm and dry.   Neurological:      Mental Status: He is alert and oriented to person, place, and time.   Psychiatric:         Behavior: Behavior normal.         Thought Content: Thought content normal.          Significant Labs: All pertinent labs within the past 24 hours have been reviewed.  CBC:   Recent Labs   Lab 11/11/24 0447   WBC 7.28   HGB 14.8   HCT 43.3        CMP:   Recent Labs   Lab 11/11/24 0447 11/12/24 0427    142   K 3.4* 3.4*    107   CO2 24 25    105   BUN 12 12   CREATININE 0.7 0.7   CALCIUM 8.2* 8.2*   ANIONGAP 12 10     Recent Lab Results         11/12/24  0427   11/11/24  1159   11/11/24  1136        Anion Gap 10           BUN 12           Calcium 8.2           Chloride 107           CO2 25           Creatinine 0.7           eGFR >60           Glucose  105           Magnesium  2.0           QRS Duration     124       [facility]HS QTC Calculation     477       Potassium 3.4           Sodium 142           Troponin I   <0.006  Comment: The reference interval for Troponin I represents the 99th percentile   cutoff   for our facility and is consistent with 3rd generation assay   performance.                   Significant Imaging: I have reviewed all pertinent imaging results/findings within the past 24 hours.

## 2024-11-12 NOTE — TRANSFER OF CARE
"Anesthesia Transfer of Care Note    Patient: Abdifatah Steele    Procedure(s) Performed: Procedure(s) (LRB):  ECHOCARDIOGRAM,TRANSESOPHAGEAL (N/A)    Patient location: PACU    Anesthesia Type: general    Transport from OR: Transported from OR on 6-10 L/min O2 by face mask with adequate spontaneous ventilation    Post pain: adequate analgesia    Post assessment: no apparent anesthetic complications and tolerated procedure well    Post vital signs: stable    Level of consciousness: sedated    Nausea/Vomiting: no nausea/vomiting    Complications: none    Transfer of care protocol was followed      Last vitals: Visit Vitals  /62   Pulse (!) 59   Temp 36.8 °C (98.2 °F)   Resp 16   Ht 5' 10" (1.778 m)   Wt 74 kg (163 lb 2.3 oz)   SpO2 98%   BMI 23.41 kg/m²     "

## 2024-11-12 NOTE — ANESTHESIA PREPROCEDURE EVALUATION
[place] - Med Surg (3rd Fl)  Cardiology  Consult Note    Patient Name: Abdifatah Steele  MRN: [id number]  Admission Date: 11/5/2024  Hospital Length of Stay: 7 days  Code Status: No Order   Attending Provider: Connor Lopez Jr., *   Consulting Provider: RACHAEL CARSON CRNA  Primary Care Physician: Mychal Valderrama MD  Principal Problem:Small bowel obstruction    Patient information was obtained from patient and ER records.     Consults  Subjective:     Chief Complaint:  palpitations     HPI:   74-year-old with past medical history of SVT, currently post hemicolectomy. CIS consulted for new onset atrial fibrillation with RVR.     Subjective & objective note cannot be loaded without a specified hospital service.    Assessment and Plan:     Assessment & plan notes cannot be loaded without a specified hospital service.      VTE Risk Mitigation (From admission, onward)           Ordered     enoxaparin injection 70 mg  Every 12 hours         11/09/24 1934     IP VTE HIGH RISK PATIENT  Once         11/05/24 1413     Place sequential compression device  Until discontinued         11/05/24 1413                  Echocardiogram 11/2024: LVEF 60%-65%, Grade 1 LVDD, 2+ TR. PASP 39 mmHg      DX: New onset afib w/ RVR  SVT  SBO, s/p hemicolectomy last wk 11/24, now almost ready for DC home  Neoplasm of cecum   LVEF 60% 11/24      Plan: supplement Mag and K  Continue Metoprolol 50 bid and anticoagulation (note pt was on apixaban for ?, and crestor at home )  Add amiodarone  for now  If still in AF tomorrow; consider GIFTY CV      Thank you for your consult. I will follow-up with patient. Please contact us if you have any additional questions.   Transcribed by  RACHAEL CARSON CRNA for Dr MERVIN Russo  Cardiology   [facility] - Med Surg (3rd Fl)  I attest that I have personally seen and examined this patient. I have reviewed and discussed the management in detail as outlined above.       Pre-op Assessment    I have reviewed the Patient Summary  Reports.     I have reviewed the Nursing Notes. I have reviewed the NPO Status.   I have reviewed the Medications.     Review of Systems  Anesthesia Hx:  No problems with previous Anesthesia                Social:  Smoker       Hematology/Oncology:  Hematology Normal   Oncology Normal                                   EENT/Dental:  EENT/Dental Normal           Cardiovascular:      Valvular problems/Murmurs, MVP   Dysrhythmias atrial fibrillation                                     Renal/:  Renal/ Normal                 Hepatic/GI:  Hepatic/GI Normal                    Musculoskeletal:  Musculoskeletal Normal                Neurological:  Neurology Normal                                      Endocrine:  Endocrine Normal            Dermatological:  Skin Normal    Psych:  Psychiatric Normal                    Physical Exam  General: Cooperative, Alert and Oriented    Airway:  Mallampati: I   Mouth Opening: Normal  TM Distance: Normal  Tongue: Normal  Neck ROM: Normal ROM    Chest/Lungs:  Clear to auscultation        Anesthesia Plan  Type of Anesthesia, risks & benefits discussed:    Anesthesia Type: Gen Natural Airway  Intra-op Monitoring Plan: Standard ASA Monitors  Post Op Pain Control Plan: multimodal analgesia  Induction:  IV  Informed Consent: Informed consent signed with the Patient and all parties understand the risks and agree with anesthesia plan.  All questions answered. Patient consented to blood products? No  ASA Score: 3  Day of Surgery Review of History & Physical: H&P Update referred to the surgeon/provider.I have interviewed and examined the patient. I have reviewed the patient's H&P dated: 11/12/2024. There are no significant changes.     Ready For Surgery From Anesthesia Perspective.     .

## 2024-11-12 NOTE — BRIEF OP NOTE
Procedure: GIFTY  Dx:  AF  : Sean Russo  Anesthesia: CRNA  See details in Cupid    Procedure: GIFTY probe inserted via bite guard without any difficulty. Standard views were obtained at usual levels. Probe was removed without any complications. Well tolerated.  Findings are as follows:   LVEF 60%  Grade 3 Atheroscerosis  No thrombus      CARDIOVERSION  PT CARDIOVERTED WITH SYNCH 100 J to NSR.    Pt returned to recovery. Resume PO and Meds as before.

## 2024-11-12 NOTE — PLAN OF CARE
Patient cardioversion was successful; patient is now in nsr; hr 66; denies any cp or sob; abdominal incision remains free of infection;

## 2024-11-12 NOTE — PROGRESS NOTES
[place] - Med Surg ([facility])  Cardiology  Progress Note    Patient Name: Abdifatah Steele  MRN: [id number]  Admission Date: 11/5/2024  Hospital Length of Stay: 7 days  Code Status: No Order   Attending Physician: Connor Lopez Jr., *   Primary Care Physician: Mychal Valderrama MD  Expected Discharge Date: 11/8/2024  Principal Problem:Small bowel obstruction    Subjective:     Chief Complaint:  palpitations      HPI:   74-year-old with past medical history of SVT, currently post hemicolectomy. CIS consulted for new onset atrial fibrillation with RVR.     ROS  Constitutional: Negative.   HENT: Negative.     Eyes: Negative.    Cardiovascular: Negative.    Respiratory: Negative.     Endocrine: Negative.    Hematologic/Lymphatic: Negative.    Skin: Negative.    Musculoskeletal: Negative.    Gastrointestinal: Negative.    Genitourinary: Negative.    Neurological: Negative.    Psychiatric/Behavioral: Negative.     All other systems reviewed and are negative.    Objective:     Vital Signs (Most Recent):  Temp: 98.2 °F (36.8 °C) (11/12/24 0725)  Pulse: 89 (11/12/24 0725)  Resp: 16 (11/12/24 0725)  BP: (!) 141/80 (11/12/24 0725)  SpO2: (!) 93 % (11/12/24 0748) Vital Signs (24h Range):  Temp:  [97.8 °F (36.6 °C)-98.9 °F (37.2 °C)] 98.2 °F (36.8 °C)  Pulse:  [] 89  Resp:  [14-20] 16  SpO2:  [92 %-95 %] 93 %  BP: (121-141)/(65-88) 141/80     Weight: 74 kg (163 lb 2.3 oz)  Body mass index is 23.41 kg/m².    SpO2: (!) 93 %         Intake/Output Summary (Last 24 hours) at 11/12/2024 0802  Last data filed at 11/11/2024 1217  Gross per 24 hour   Intake 577 ml   Output --   Net 577 ml       Lines/Drains/Airways       Peripheral Intravenous Line  Duration                  Peripheral IV - Single Lumen 11/05/24 1118 22 G 1 in No Left;Posterior Forearm 6 days                    Physical Exam  Constitutional:       General: He is not in acute distress.     Appearance: He is not ill-appearing.   Cardiovascular:      Rate and Rhythm:  Regular rhythm. Tachycardia present.      Heart sounds: Murmur heard.      No friction rub. No gallop.   Pulmonary:      Effort: Pulmonary effort is normal.      Breath sounds: Normal breath sounds.   Abdominal:      General: Abdomen is flat.      Palpations: Abdomen is soft.   Musculoskeletal:         General: Normal range of motion.   Skin:     General: Skin is warm and dry.      Capillary Refill: Capillary refill takes less than 2 seconds.   Neurological:      General: No focal deficit present.      Mental Status: He is alert and oriented to person, place, and time. Mental status is at baseline. .    Significant Labs:   Recent Lab Results         11/12/24  0427   11/11/24  1159   11/11/24  1136        Anion Gap 10           BUN 12           Calcium 8.2           Chloride 107           CO2 25           Creatinine 0.7           eGFR >60           Glucose 105           Magnesium  2.0           QRS Duration     124       OHS QTC Calculation     477       Potassium 3.4           Sodium 142           Troponin I   <0.006  Comment: The reference interval for Troponin I represents the 99th percentile   cutoff   for our facility and is consistent with 3rd generation assay   performance.                   Significant Imaging: CT scan: CT ABDOMEN PELVIS WITH CONTRAST: No results found for this visit on 11/05/24. and CT ABDOMEN PELVIS WITHOUT CONTRAST: No results found for this visit on 11/05/24., Echocardiogram: 2D echo with color flow doppler: No results found for this or any previous visit. and Transthoracic echo (TTE) complete (Cupid Only):   Results for orders placed or performed during the hospital encounter of 11/05/24   Echo   Result Value Ref Range    BSA 1.91 m2    LVOT stroke volume 48.0 cm3    LVIDd 4.7 3.5 - 6.0 cm    LV Systolic Volume 27.77 mL    LV Systolic Volume Index 14.5 mL/m2    LVIDs 2.7 2.1 - 4.0 cm    LV ESV A2C 37.12 mL    LV Diastolic Volume 101.92 mL    LV ESV A4C 21.26 mL    LV Diastolic Volume  Index 53.36 mL/m2    Left Ventricular End Systolic Volume by Teichholz Method 27.77 mL    Left Ventricular End Diastolic Volume by Teichholz Method 101.92 mL    IVS 0.9 0.6 - 1.1 cm    LVOT diameter 2.0 cm    LVOT area 3.1 cm2    FS 42.6 28 - 44 %    Left Ventricle Relative Wall Thickness 0.34 cm    PW 0.8 0.6 - 1.1 cm    LV mass 132.3 g    LV Mass Index 69.3 g/m2    MV Peak E Pollo 0.54 m/s    TDI LATERAL 0.08 m/s    TDI SEPTAL 0.08 m/s    E/E' ratio 6.75 m/s    MV Peak A Pollo 0.83 m/s    TR Max Pollo 2.99 m/s    E/A ratio 0.65     IVRT 79.92 msec    E wave deceleration time 247.82 msec    LV SEPTAL E/E' RATIO 6.75 m/s    LV LATERAL E/E' RATIO 6.75 m/s    PV Peak S Pollo 0.29 m/s    PV Peak D Pollo 0.36 m/s    Pulm vein S/D ratio 0.81     LVOT peak pollo 0.8 m/s    Left Ventricular Outflow Tract Mean Velocity 0.47 cm/s    Left Ventricular Outflow Tract Mean Gradient 1.03 mmHg    RV S' 10.03 cm/s    TAPSE 2.54 cm    LA size 3.01 cm    Left Atrium Major Axis 4.68 cm    LA Vol (MOD) 29.41 mL    COREY (MOD) 15.4 mL/m2    RA Major Axis 5.06 cm    AV mean gradient 2.4 mmHg    AV peak gradient 5.8 mmHg    Ao peak pollo 1.2 m/s    Ao VTI 21.0 cm    LVOT peak VTI 15.3 cm    AV valve area 2.3 cm²    AV Velocity Ratio 0.67     AV index (prosthetic) 0.73     FADI by Velocity Ratio 2.1 cm²    MV stenosis pressure 1/2 time 60.82 ms    MV valve area p 1/2 method 3.62 cm2    Triscuspid Valve Regurgitation Peak Gradient 36 mmHg    PV PEAK VELOCITY 0.75 m/s    PV peak gradient 2 mmHg    Pulmonary Valve Mean Velocity 0.46 m/s    Mean e' 0.08 m/s    ZLVIDS -1.61     ZLVIDD -1.34     LA area A4C 11.55 cm2    LA area A2C 14.09 cm2    RVDD 2.25 cm    TV resting pulmonary artery pressure 39 mmHg    RV TB RVSP 6 mmHg    Est. RA pres 3 mmHg    Narrative      Left Ventricle: The left ventricle is normal in size. Normal wall   thickness. There is normal systolic function with a visually estimated   ejection fraction of 60 - 65%. Grade I diastolic  dysfunction.    Right Ventricle: Normal right ventricular cavity size. Systolic   function is normal.    Aortic Valve: The aortic valve is a trileaflet valve. There is aortic   valve sclerosis.    Tricuspid Valve: There is moderate regurgitation.    Pulmonary Artery: The estimated pulmonary artery systolic pressure is   39 mmHg.     , and X-Ray: CXR: X-Ray Chest 1 View (CXR): No results found for this visit on 11/05/24. and X-Ray Chest PA and Lateral (CXR): No results found for this visit on 11/05/24. and KUB: X-Ray Abdomen AP 1 View (KUB): No results found for this visit on 11/05/24.  Assessment and Plan:         Active Diagnoses:    Diagnosis Date Noted POA    PRINCIPAL PROBLEM:  Small bowel obstruction [K56.609] 11/05/2024 Yes    Atrial fibrillation with rapid ventricular response [I48.91] 11/11/2024 No    Electrolyte abnormality [E87.8] 11/11/2024 No    Smoker [F17.200] 11/08/2024 Yes    Supraventricular tachycardia [I47.10] 11/07/2024 No    Neoplasm of cecum [D49.0] 11/05/2024 Yes      Problems Resolved During this Admission:       VTE Risk Mitigation (From admission, onward)           Ordered     enoxaparin injection 70 mg  Every 12 hours         11/09/24 1934     IP VTE HIGH RISK PATIENT  Once         11/05/24 1413     Place sequential compression device  Until discontinued         11/05/24 1413                  Current Facility-Administered Medications   Medication    amiodarone tablet 400 mg    docusate sodium capsule 100 mg    enoxaparin injection 70 mg    HYDROcodone-acetaminophen 7.5-325 mg per tablet 1 tablet    metoprolol tartrate (LOPRESSOR) tablet 50 mg    nicotine 21 mg/24 hr 1 patch    ondansetron injection 4 mg    pantoprazole EC tablet 40 mg    potassium chloride 10 mEq in 100 mL IVPB    traMADoL tablet 50 mg      Echocardiogram 11/2024: LVEF 60%-65%, Grade 1 LVDD, 2+ TR. PASP 39 mmHg        DX: New onset afib w/ RVR  SVT  SBO, s/p hemicolectomy last wk 11/24, now almost ready for DC  home  Neoplasm of cecum   LVEF 60% 11/24        Plan: supplemented Mag and K  Continue Metoprolol 50 bid and anticoagulation (Added amiodarone  for now  GIFTY CV successful at 100 J today.    Transcribed by  Valentino Acuna NP for Dr MERVIN Russo  Cardiology  [place] - [facility] Surg (3rd Fl)  I attest that I have personally seen and examined this patient. I have reviewed and discussed the management in detail as outlined above.

## 2024-11-12 NOTE — PROGRESS NOTES
[facility] Surg ([facility])  [facility] Medicine  Progress Note    Patient Name: Abdifatah Steele  MRN: [id number]  Patient Class: IP- Inpatient   Admission Date: 11/5/2024  Length of Stay: 7 days  Attending Physician: Connor Lopez Jr., *  Primary Care Provider: Mychal Valderrama MD        Subjective:     Principal Problem:Small bowel obstruction        HPI:  Medicine was consulted by surgery to see this 74-year-old white male status post hemicolectomy he has been having runs of V-tach.  These are all asymptomatic.  Patient denies chest pain, shortness of breath.  He still has an NG tube in place.    Overview/Hospital Course:  Medicine consulted earlier this admission for arrythmia. Given Mg and had no further episodes.  Medicine team called again evening of 11/9/24 for new onset a-fib RVR. HR 120s on average. EKG confirming a-fib. Labs OK and no acute electrolytes abnormalities. TSH normal.  Cardiology had evalauted patient prior. TTE with diastolic dysfunction but no valve abnormalities and EF normal. Their plan was for stress testing outpatient.    11/9/24 patient treated with IV metoprolol 5mg x2 doses and PO metoprolol 50mg PO. His HR did improved to 70-80s and remains normal throughout the evening though still in a-fib. Transitioned to treatment dose lovenox.    Today, patient will remain on telemetry on PO metoprolol. Will ask CM to assist with Eliquis pricing for outpatient use. If HR is controlled can discharge later today or tomorrow AM on metoprolol 50mg PO BID and Eliquis 5mg BID. Happy to write these two medication on discharge for surgery team if needed.  He will need close PCP and cardiology follow up later this week.     11/11 PT HD stable on room air.  Went into afib with RVR.  Received IV metoprolol.  Current HR 90's.  On lovenox and metoprolol.  Waiting for cardiology recommendations.    Replace potassium.      11/12: no acute events overnight. HR stable. Patient was made NPO for possible GIFTY  cardioversion with cardiology today. Dispo pending cardiology recs. K 3.4-will replace    Past Medical History:   Diagnosis Date    Fractures        Past Surgical History:   Procedure Laterality Date    COLON RESECTION Right 11/5/2024    Procedure: COLON RESECTION;  Surgeon: Connor Lpoez Jr., MD;  Location: [facility] OR;  Service: General;  Laterality: Right;    DIAGNOSTIC LAPAROSCOPY N/A 11/5/2024    Procedure: LAPAROSCOPY, DIAGNOSTIC (ATTEMPTED);  Surgeon: Connor Lopez Jr., MD;  Location: [facility] OR;  Service: General;  Laterality: N/A;    HAND SURGERY      LAPAROTOMY, EXPLORATORY N/A 11/5/2024    Procedure: LAPAROTOMY, EXPLORATORY;  Surgeon: Connor Lopez Jr., MD;  Location: [facility] OR;  Service: General;  Laterality: N/A;    SKULL FRACTURE ELEVATION         Review of patient's allergies indicates:  No Known Allergies    No current facility-administered medications on file prior to encounter.     Current Outpatient Medications on File Prior to Encounter   Medication Sig    carbamazepine (TEGRETOL XR) 100 MG 12 hr tablet Take 100 mg by mouth 2 (two) times daily.      CRESTOR 10 mg tablet      Family History    None       Tobacco Use    Smoking status: Some Days     Current packs/day: 0.50     Types: Cigarettes    Smokeless tobacco: Not on file   Substance and Sexual Activity    Alcohol use: No    Drug use: No    Sexual activity: Not on file     Review of Systems   Constitutional:  Negative for fatigue and fever.   HENT:  Negative for congestion, ear pain, hearing loss, rhinorrhea and sore throat.    Eyes:  Negative for redness and visual disturbance.   Respiratory:  Negative for cough, shortness of breath and wheezing.    Cardiovascular:  Negative for chest pain, palpitations and leg swelling.   Gastrointestinal:  Negative for constipation, diarrhea, nausea and vomiting.   Musculoskeletal:  Negative for back pain, joint swelling and neck pain.   Neurological:  Negative for dizziness, light-headedness and  headaches.     Objective:     Vital Signs (Most Recent):  Temp: 98.2 °F (36.8 °C) (11/12/24 0725)  Pulse: 81 (11/12/24 0800)  Resp: 16 (11/12/24 0725)  BP: (!) 141/80 (11/12/24 0725)  SpO2: (!) 93 % (11/12/24 0748) Vital Signs (24h Range):  Temp:  [97.8 °F (36.6 °C)-98.9 °F (37.2 °C)] 98.2 °F (36.8 °C)  Pulse:  [] 81  Resp:  [14-20] 16  SpO2:  [92 %-95 %] 93 %  BP: (121-141)/(71-88) 141/80     Weight: 74 kg (163 lb 2.3 oz)  Body mass index is 23.41 kg/m².     Physical Exam  Vitals and nursing note reviewed.   Constitutional:       General: He is not in acute distress.     Appearance: He is well-developed.   HENT:      Head: Normocephalic and atraumatic.      Right Ear: External ear normal.      Left Ear: External ear normal.   Eyes:      General:         Right eye: No discharge.         Left eye: No discharge.   Neck:      Thyroid: No thyromegaly.   Cardiovascular:      Rate and Rhythm: Normal rate. Rhythm irregular.      Heart sounds: No murmur heard.  Pulmonary:      Effort: Pulmonary effort is normal. No respiratory distress.      Breath sounds: Normal breath sounds.   Abdominal:      General: There is no distension.      Tenderness: There is no abdominal tenderness.   Skin:     General: Skin is warm and dry.   Neurological:      Mental Status: He is alert and oriented to person, place, and time.   Psychiatric:         Behavior: Behavior normal.         Thought Content: Thought content normal.          Significant Labs: All pertinent labs within the past 24 hours have been reviewed.  CBC:   Recent Labs   Lab 11/11/24 0447   WBC 7.28   HGB 14.8   HCT 43.3        CMP:   Recent Labs   Lab 11/11/24 0447 11/12/24 0427    142   K 3.4* 3.4*    107   CO2 24 25    105   BUN 12 12   CREATININE 0.7 0.7   CALCIUM 8.2* 8.2*   ANIONGAP 12 10     Recent Lab Results         11/12/24 0427 11/11/24  1159   11/11/24  1136        Anion Gap 10           BUN 12           Calcium 8.2            Chloride 107           CO2 25           Creatinine 0.7           eGFR >60           Glucose 105           Magnesium  2.0           QRS Duration     124       OHS QTC Calculation     477       Potassium 3.4           Sodium 142           Troponin I   <0.006  Comment: The reference interval for Troponin I represents the 99th percentile   cutoff   for our facility and is consistent with 3rd generation assay   performance.                   Significant Imaging: I have reviewed all pertinent imaging results/findings within the past 24 hours.    Assessment/Plan:      * Small bowel obstruction  Management per surgery team  Cleared from surgery perspective for discharge      Electrolyte abnormality  Potassium 3.4   IV replacement ordered by Dr. Russo today  Follow labs      Atrial fibrillation with rapid ventricular response  Patient has paroxysmal (<7 days) atrial fibrillation. Patient is currently in atrial fibrillation. [id number] Score: 2. The patients heart rate in the last 24 hours is as follows:  Pulse  Min: 75  Max: 108     Antiarrhythmics  metoprolol tartrate (LOPRESSOR) tablet 50 mg, 2 times daily, Oral  amiodarone tablet 400 mg, 3 times daily, Oral    Anticoagulants  enoxaparin injection 70 mg, Every 12 hours, Subcutaneous  apixaban tablet, 2 times daily, Oral    Plan  - Replete lytes with a goal of K>4, Mg >2  - Patient is anticoagulated, see medications listed above.  - Patient's afib is currently controlled    Cardiology recommendations pending     11/12: cardiology planning GIFTY cardioversion today.  K replacement orders by Dr. Russo already placed  NPO pending procedure  Cont telemetry  Dispo pending cardiology recommendations.     Smoker  Smoking cessation recommended      Supraventricular tachycardia  Patient has had several runs of ventricular tachycardia.  His magnesium level was 1.8 so I gave him 2 g of Mag and he has not had any runs of V-tach since.  Continue to monitor on telemetry.    Cardiology  consulted    11/10: Medicine reconsulted overnight. Patient had no further SVT on telemetry since initial consult  until last night. A-fib RVR noted with HR sustaining 120s. Treated with IV metoprolol and now on PO metoprolol  HR 70-80s  Remains in a-fib  Treatment dose lovenox started  Will plan to transition to PO Eliquis. CM to assist with pricing to ensure able to obtain at discharge.    If HR remains stable, patient could discharge with metoprolol and eliquis and see cardiology later this week for repeat EKG and discuss antiarrythmic therapy. If remains inpatient overnight cardiology to reassess in AM.   TTE and labs are normal      Neoplasm of cecum  Management per surgery team  Cleared from surgery perspective for discharge        VTE Risk Mitigation (From admission, onward)           Ordered     enoxaparin injection 70 mg  Every 12 hours         11/09/24 1934     IP VTE HIGH RISK PATIENT  Once         11/05/24 1413     Place sequential compression device  Until discontinued         11/05/24 1413                    Discharge Planning   ANAMIKA: 11/8/2024     Code Status: Not on file   Is the patient medically ready for discharge?:     Reason for patient still in hospital (select all that apply): Treatment  Discharge Plan A: Home                  Stacy Fermin MD  Department of Hospital Medicine   [place] - Med Surg (3rd Fl)

## 2024-11-12 NOTE — ANESTHESIA POSTPROCEDURE EVALUATION
Anesthesia Post Evaluation    Patient: Abdifatah Steele    Procedure(s) Performed: Procedure(s) (LRB):  ECHOCARDIOGRAM,TRANSESOPHAGEAL (N/A)    Final Anesthesia Type: general      Patient location during evaluation: PACU  Patient participation: Yes- Able to Participate  Level of consciousness: awake and alert, oriented and awake  Post-procedure vital signs: reviewed and stable  Pain management: adequate  Airway patency: patent    PONV status at discharge: No PONV  Anesthetic complications: no      Cardiovascular status: blood pressure returned to baseline, hemodynamically stable and stable  Respiratory status: unassisted, spontaneous ventilation and room air  Hydration status: euvolemic  Follow-up not needed.              Vitals Value Taken Time   /62 11/12/24 1247   Temp 37 11/12/24 1253   Pulse 59 11/12/24 1247   Resp 16 11/12/24 1253   SpO2 98 % 11/12/24 1247         No case tracking events are documented in the log.      Pain/Celena Score: Celena Score: 10 (11/12/2024 12:41 PM)

## 2024-11-12 NOTE — NURSING
NOTIFIED BY TELEMETRY THAT PATIENT HAD SEVERAL SMALL RUNS OF V-TACH; PATIENT IS ASYMPTOMATIC; V/S WNL; PATIENT DENIES ANY CP OR SOB AT PRESENT TIME; KAT HARVEY NP WITH [facility] NOTIFIED OF  PATIENT'S CURRENT CONDITION; NO NEW ORDERS NOTED. WILL CONTINUE TO MONITOR.

## 2024-11-12 NOTE — ASSESSMENT & PLAN NOTE
Patient has paroxysmal (<7 days) atrial fibrillation. Patient is currently in atrial fibrillation. [id number] Score: 2. The patients heart rate in the last 24 hours is as follows:  Pulse  Min: 75  Max: 108     Antiarrhythmics  metoprolol tartrate (LOPRESSOR) tablet 50 mg, 2 times daily, Oral  amiodarone tablet 400 mg, 3 times daily, Oral    Anticoagulants  enoxaparin injection 70 mg, Every 12 hours, Subcutaneous  apixaban tablet, 2 times daily, Oral    Plan  - Replete lytes with a goal of K>4, Mg >2  - Patient is anticoagulated, see medications listed above.  - Patient's afib is currently controlled    Cardiology recommendations pending     11/12: cardiology planning GIFTY cardioversion today.  K replacement orders by Dr. Russo already placed  NPO pending procedure  Cont telemetry  Dispo pending cardiology recommendations.

## 2024-11-13 VITALS
HEIGHT: 70 IN | HEART RATE: 62 BPM | OXYGEN SATURATION: 96 % | SYSTOLIC BLOOD PRESSURE: 123 MMHG | DIASTOLIC BLOOD PRESSURE: 67 MMHG | WEIGHT: 163.13 LBS | TEMPERATURE: 98 F | RESPIRATION RATE: 20 BRPM | BODY MASS INDEX: 23.35 KG/M2

## 2024-11-13 LAB
ANION GAP SERPL CALC-SCNC: 9 MMOL/L (ref 8–16)
BASOPHILS # BLD AUTO: 0.02 K/UL (ref 0–0.2)
BASOPHILS NFR BLD: 0.3 % (ref 0–1.9)
BUN SERPL-MCNC: 16 MG/DL (ref 8–23)
CALCIUM SERPL-MCNC: 8.2 MG/DL (ref 8.7–10.5)
CHLORIDE SERPL-SCNC: 107 MMOL/L (ref 95–110)
CO2 SERPL-SCNC: 25 MMOL/L (ref 23–29)
CREAT SERPL-MCNC: 0.7 MG/DL (ref 0.5–1.4)
DIFFERENTIAL METHOD BLD: ABNORMAL
EOSINOPHIL # BLD AUTO: 0.2 K/UL (ref 0–0.5)
EOSINOPHIL NFR BLD: 2.9 % (ref 0–8)
ERYTHROCYTE [DISTWIDTH] IN BLOOD BY AUTOMATED COUNT: 13.8 % (ref 11.5–14.5)
EST. GFR  (NO RACE VARIABLE): >60 ML/MIN/1.73 M^2
GLUCOSE SERPL-MCNC: 113 MG/DL (ref 70–110)
HCT VFR BLD AUTO: 36.4 % (ref 40–54)
HGB BLD-MCNC: 12.2 G/DL (ref 14–18)
IMM GRANULOCYTES # BLD AUTO: 0.04 K/UL (ref 0–0.04)
IMM GRANULOCYTES NFR BLD AUTO: 0.7 % (ref 0–0.5)
LYMPHOCYTES # BLD AUTO: 1 K/UL (ref 1–4.8)
LYMPHOCYTES NFR BLD: 17.3 % (ref 18–48)
MCH RBC QN AUTO: 32 PG (ref 27–31)
MCHC RBC AUTO-ENTMCNC: 33.5 G/DL (ref 32–36)
MCV RBC AUTO: 96 FL (ref 82–98)
MONOCYTES # BLD AUTO: 0.7 K/UL (ref 0.3–1)
MONOCYTES NFR BLD: 11.5 % (ref 4–15)
NEUTROPHILS # BLD AUTO: 4 K/UL (ref 1.8–7.7)
NEUTROPHILS NFR BLD: 67.3 % (ref 38–73)
NRBC BLD-RTO: 0 /100 WBC
OHS QRS DURATION: 124 MS
OHS QRS DURATION: 128 MS
OHS QRS DURATION: 128 MS
OHS QTC CALCULATION: 426 MS
OHS QTC CALCULATION: 426 MS
OHS QTC CALCULATION: 465 MS
PLATELET # BLD AUTO: 243 K/UL (ref 150–450)
PMV BLD AUTO: 9.9 FL (ref 9.2–12.9)
POTASSIUM SERPL-SCNC: 3.7 MMOL/L (ref 3.5–5.1)
RBC # BLD AUTO: 3.81 M/UL (ref 4.6–6.2)
SODIUM SERPL-SCNC: 141 MMOL/L (ref 136–145)
WBC # BLD AUTO: 5.9 K/UL (ref 3.9–12.7)

## 2024-11-13 PROCEDURE — 25000003 PHARM REV CODE 250: Performed by: SURGERY

## 2024-11-13 PROCEDURE — 25000003 PHARM REV CODE 250: Performed by: INTERNAL MEDICINE

## 2024-11-13 PROCEDURE — 99900035 HC TECH TIME PER 15 MIN (STAT)

## 2024-11-13 PROCEDURE — 25000003 PHARM REV CODE 250: Performed by: NURSE PRACTITIONER

## 2024-11-13 PROCEDURE — 85025 COMPLETE CBC W/AUTO DIFF WBC: CPT | Performed by: INTERNAL MEDICINE

## 2024-11-13 PROCEDURE — 36415 COLL VENOUS BLD VENIPUNCTURE: CPT | Performed by: INTERNAL MEDICINE

## 2024-11-13 PROCEDURE — 94760 N-INVAS EAR/PLS OXIMETRY 1: CPT

## 2024-11-13 PROCEDURE — 80048 BASIC METABOLIC PNL TOTAL CA: CPT | Performed by: INTERNAL MEDICINE

## 2024-11-13 RX ORDER — AMIODARONE HYDROCHLORIDE 200 MG/1
200 TABLET ORAL 2 TIMES DAILY
Status: DISCONTINUED | OUTPATIENT
Start: 2024-11-13 | End: 2024-11-13 | Stop reason: HOSPADM

## 2024-11-13 RX ORDER — METOPROLOL TARTRATE 50 MG/1
50 TABLET ORAL 2 TIMES DAILY
Qty: 180 TABLET | Refills: 3 | Status: SHIPPED | OUTPATIENT
Start: 2024-11-13 | End: 2025-11-13

## 2024-11-13 RX ORDER — AMIODARONE HYDROCHLORIDE 200 MG/1
200 TABLET ORAL 2 TIMES DAILY
Qty: 60 TABLET | Refills: 11 | Status: SHIPPED | OUTPATIENT
Start: 2024-11-13 | End: 2025-11-13

## 2024-11-13 RX ORDER — POTASSIUM CHLORIDE 20 MEQ/1
40 TABLET, EXTENDED RELEASE ORAL ONCE
Status: COMPLETED | OUTPATIENT
Start: 2024-11-13 | End: 2024-11-13

## 2024-11-13 RX ADMIN — METOPROLOL TARTRATE 50 MG: 50 TABLET, FILM COATED ORAL at 08:11

## 2024-11-13 RX ADMIN — APIXABAN 5 MG: 5 TABLET, FILM COATED ORAL at 08:11

## 2024-11-13 RX ADMIN — PANTOPRAZOLE SODIUM 40 MG: 40 TABLET, DELAYED RELEASE ORAL at 08:11

## 2024-11-13 RX ADMIN — AMIODARONE HYDROCHLORIDE 400 MG: 200 TABLET ORAL at 08:11

## 2024-11-13 RX ADMIN — POTASSIUM CHLORIDE 40 MEQ: 1500 TABLET, EXTENDED RELEASE ORAL at 08:11

## 2024-11-13 NOTE — NURSING
Dr. Covington (general surgery) notified that patient is cleared from a cardiology and medicine standpoint for discharge.     Dr. Covington states he will put in discharge orders.

## 2024-11-13 NOTE — DISCHARGE SUMMARY
[place] - [facility] (3rd Fl)  General Surgery  Discharge Summary      Patient Name: Abdifatah Steele  MRN: [id number]  Admission Date: 11/5/2024  Hospital Length of Stay: 8 days  Discharge Date and Time:  11/13/2024 11:25 AM  Attending Physician: Connor Kemp Jr., *   Discharging Provider: Annemarie Covington MD  Primary Care Provider: Mychal Valderrama MD    HPI:   Right colon mass    Procedure(s) (LRB):  ECHOCARDIOGRAM,TRANSESOPHAGEAL (N/A)      Indwelling Lines/Drains at time of discharge:   Lines/Drains/Airways       None                 Hospital Course: 11/7/2024: Patient is POD #2 from right colectomy. Nothing out of NG tube, abdomen rumbling a bit today. Will try clears.   11/13/2024: Patient is doing well, eating, bowel movements, cleared from medicine and cards.     Goals of Care Treatment Preferences:  Code Status: Full Code      Consults:   Consults (From admission, onward)          Status Ordering Provider     Inpatient consult to Cardiology-[facility]  Once        Provider:  Sean Russo MD    Acknowledged KYRIE OROPEZA     Inpatient consult to Social Work/Case Management  Once        Provider:  (Not yet assigned)    Completed CONNOR KEMP JR.     [facility] [place]  [facility]        Provider:  Carlos Alberto Linda MD    Completed HOLLIE LARA     Inpatient consult to Internal Medicine  Once        Provider:  Hollie Lara MD    Completed ANNEMARIE COVINGTON            Significant Diagnostic Studies: N/A    Pending Diagnostic Studies:       None          Final Active Diagnoses:    Diagnosis Date Noted POA    PRINCIPAL PROBLEM:  Small bowel obstruction [K56.609] 11/05/2024 Yes    Atrial fibrillation with rapid ventricular response [I48.91] 11/11/2024 No    Electrolyte abnormality [E87.8] 11/11/2024 No    Smoker [F17.200] 11/08/2024 Yes    Neoplasm of cecum [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    Specialty: Cardiology  Why: At 1pm  Contact information:  [address] [place] [address]  [phone]                           Patient Instructions:      Ambulatory referral/consult to Smoking Cessation Program   Standing Status: Future   Referral Priority: Routine Referral Type: Consultation   Referral Reason: Specialty Services Required   Requested Specialty: CTTS   Number of Visits Requested: 1     Diet general     Call MD for:  temperature >100.4     Call MD for:  persistent nausea and vomiting     Call MD for:  severe uncontrolled pain     Call MD for:  difficulty breathing, headache or visual disturbances     Call MD for:  redness, tenderness, or signs of infection (pain, swelling, redness, odor or green/yellow discharge around incision site)     Lifting restrictions   Order Comments: No heavy lifting x 6 weeks.     Notify your health care provider if you experience any of the following:  temperature >100.4     Notify your health care provider if you experience any of the following:  persistent nausea and vomiting or diarrhea     Notify your health care provider if you experience any of the following:  severe uncontrolled pain     Notify your health care provider if you experience any of the following:  redness, tenderness, or signs of infection (pain, swelling, redness, odor or green/yellow discharge around incision site)     Reason for not Prescribing Nicotine Replacement     Order Specific Question Answer Comments   Reason for not Prescribing: Patient refused      Shower on day dressing removed (No bath)     Activity as tolerated     Medications:  Reconciled Home Medications:      Medication List        START taking these medications      amiodarone 200 MG Tab  Commonly known as: PACERONE  Take 1 tablet (200 mg total) by mouth 2 (two) times daily.     * apixaban 5 mg Tab  Commonly known as:  ELIQUIS  Take 1 tablet (5 mg total) by mouth 2 (two) times daily.     * apixaban 5 mg Tab  Commonly known as: ELIQUIS  Take 1 tablet (5 mg total) by mouth 2 (two) times daily.     HYDROcodone-acetaminophen 5-325 mg per tablet  Commonly known as: NORCO  Take 1 tablet by mouth every 6 (six) hours as needed for Pain.     metoprolol tartrate 50 MG tablet  Commonly known as: LOPRESSOR  Take 1 tablet (50 mg total) by mouth 2 (two) times daily.           * This list has 2 medication(s) that are the same as other medications prescribed for you. Read the directions carefully, and ask your doctor or other care provider to review them with you.                CONTINUE taking these medications      carBAMazepine 100 MG 12 hr tablet  Commonly known as: TEGRETOL XR  Take 100 mg by mouth 2 (two) times daily.     CRESTOR 10 mg tablet  Generic drug: rosuvastatin            Time spent on the discharge of patient: 20 minutes    Nixon Covington MD  General Surgery  [place] - Med Surg (3rd Fl)

## 2024-11-13 NOTE — PLAN OF CARE
Problem: Adult Inpatient Plan of Care  Goal: Plan of Care Review  Outcome: Progressing  Flowsheets (Taken 11/13/2024 0546)  Plan of Care Reviewed With:   patient   spouse  Goal: Patient-Specific Goal (Individualized)  Outcome: Progressing  Goal: Absence of Hospital-Acquired Illness or Injury  Outcome: Progressing  Goal: Optimal Comfort and Wellbeing  Outcome: Progressing  Goal: Readiness for Transition of Care  Outcome: Progressing     Problem: Wound  Goal: Optimal Coping  Outcome: Progressing  Goal: Optimal Functional Ability  Outcome: Progressing  Goal: Absence of Infection Signs and Symptoms  Outcome: Progressing  Goal: Improved Oral Intake  Outcome: Progressing  Goal: Optimal Pain Control and Function  Outcome: Progressing  Goal: Skin Health and Integrity  Outcome: Progressing  Goal: Optimal Wound Healing  Outcome: Progressing     Problem: Fall Injury Risk  Goal: Absence of Fall and Fall-Related Injury  Outcome: Progressing     Problem: Pain Acute  Goal: Optimal Pain Control and Function  Outcome: Progressing     Problem: Skin Injury Risk Increased  Goal: Skin Health and Integrity  Outcome: Progressing     Problem: Gas Exchange Impaired  Goal: Optimal Gas Exchange  Outcome: Progressing     Problem: Dysrhythmia  Goal: Normalized Cardiac Rhythm  Outcome: Progressing

## 2024-11-13 NOTE — PROGRESS NOTES
[place] - [facility] ([facility])  Cardiology  Progress Note    Patient Name: Abdifatah Steele  MRN: [id number]  Admission Date: 11/5/2024  Hospital Length of Stay: 8 days  Code Status: Full Code   Attending Physician: Connor Lopez Jr., *   Primary Care Physician: Mychal Valderrama MD  Expected Discharge Date: 11/8/2024  Principal Problem:Small bowel obstruction    Subjective:     Chief Complaint:  palpitations      HPI:   74-year-old with past medical history of SVT, currently post hemicolectomy. [facility] consulted for new onset atrial fibrillation with RVR.    ROS  Constitutional: Negative.   HENT: Negative.     Eyes: Negative.    Cardiovascular: Negative.    Respiratory: Negative.     Endocrine: Negative.    Hematologic/Lymphatic: Negative.    Skin: Negative.    Musculoskeletal: Negative.    Gastrointestinal: Negative.    Genitourinary: Negative.    Neurological: Negative.    Psychiatric/Behavioral: Negative.     All other systems reviewed and are negative.      Objective:     Vital Signs (Most Recent):  Temp: 98 °F (36.7 °C) (11/13/24 0406)  Pulse: (!) 50 (11/13/24 0600)  Resp: 18 (11/13/24 0406)  BP: (!) 115/57 (11/13/24 0406)  SpO2: (!) 92 % (11/13/24 0720) Vital Signs (24h Range):  Temp:  [97.8 °F (36.6 °C)-98 °F (36.7 °C)] 98 °F (36.7 °C)  Pulse:  [50-81] 50  Resp:  [18-20] 18  SpO2:  [91 %-99 %] 92 %  BP: ()/(54-66) 115/57     Weight: 74 kg (163 lb 2.3 oz)  Body mass index is 23.41 kg/m².    SpO2: (!) 92 %       No intake or output data in the 24 hours ending 11/13/24 0737    Lines/Drains/Airways       Peripheral Intravenous Line  Duration                  Peripheral IV - Single Lumen 11/05/24 1118 22 G 1 in No Left;Posterior Forearm 7 days                    Physical Exam  Constitutional:       General: He is not in acute distress.     Appearance: He is not ill-appearing.   Cardiovascular:      Rate and Rhythm: Regular rhythm. Tachycardia present.      Heart sounds: Murmur heard.      No friction rub. No gallop.    Pulmonary:      Effort: Pulmonary effort is normal.      Breath sounds: Normal breath sounds.   Abdominal:      General: Abdomen is flat.      Palpations: Abdomen is soft.   Musculoskeletal:         General: Normal range of motion.   Skin:     General: Skin is warm and dry.      Capillary Refill: Capillary refill takes less than 2 seconds.   Neurological:      General: No focal deficit present.      Mental Status: He is alert and oriented to person, place, and time. Mental status is at baseline. .       Significant Labs:   Recent Lab Results         11/13/24  0448   11/12/24  1242        Anion Gap 9         Baso # 0.02         Basophil % 0.3         BSA   1.91       BUN 16         Calcium 8.2         Chloride 107         CO2 25         Creatinine 0.7         Differential Method Automated         eGFR >60         Eos # 0.2         Eos % 2.9         Glucose 113         Gran # (ANC) 4.0         Gran % 67.3         Hematocrit 36.4         Hemoglobin 12.2         Immature Grans (Abs) 0.04  Comment: Mild elevation in immature granulocytes is non specific and   can be seen in a variety of conditions including stress response,   acute inflammation, trauma and pregnancy. Correlation with other   laboratory and clinical findings is essential.           Immature Granulocytes 0.7         Lymph # 1.0         Lymph % 17.3         MCH 32.0         MCHC 33.5         MCV 96         Mono # 0.7         Mono % 11.5         MPV 9.9         nRBC 0         Platelet Count 243         Potassium 3.7         RBC 3.81         RDW 13.8         Sodium 141         WBC 5.90                 Significant Imaging: CT scan: CT ABDOMEN PELVIS WITH CONTRAST: No results found for this visit on 11/05/24. and CT ABDOMEN PELVIS WITHOUT CONTRAST: No results found for this visit on 11/05/24., Echocardiogram: 2D echo with color flow doppler: No results found for this or any previous visit. and Transthoracic echo (TTE) complete (Cupid Only):   Results for  orders placed or performed during the hospital encounter of 11/05/24   Echo   Result Value Ref Range    BSA 1.91 m2    LVOT stroke volume 48.0 cm3    LVIDd 4.7 3.5 - 6.0 cm    LV Systolic Volume 27.77 mL    LV Systolic Volume Index 14.5 mL/m2    LVIDs 2.7 2.1 - 4.0 cm    LV ESV A2C 37.12 mL    LV Diastolic Volume 101.92 mL    LV ESV A4C 21.26 mL    LV Diastolic Volume Index 53.36 mL/m2    Left Ventricular End Systolic Volume by Teichholz Method 27.77 mL    Left Ventricular End Diastolic Volume by Teichholz Method 101.92 mL    IVS 0.9 0.6 - 1.1 cm    LVOT diameter 2.0 cm    LVOT area 3.1 cm2    FS 42.6 28 - 44 %    Left Ventricle Relative Wall Thickness 0.34 cm    PW 0.8 0.6 - 1.1 cm    LV mass 132.3 g    LV Mass Index 69.3 g/m2    MV Peak E Pollo 0.54 m/s    TDI LATERAL 0.08 m/s    TDI SEPTAL 0.08 m/s    E/E' ratio 6.75 m/s    MV Peak A Pollo 0.83 m/s    TR Max Pollo 2.99 m/s    E/A ratio 0.65     IVRT 79.92 msec    E wave deceleration time 247.82 msec    LV SEPTAL E/E' RATIO 6.75 m/s    LV LATERAL E/E' RATIO 6.75 m/s    PV Peak S Pollo 0.29 m/s    PV Peak D Pollo 0.36 m/s    Pulm vein S/D ratio 0.81     LVOT peak pollo 0.8 m/s    Left Ventricular Outflow Tract Mean Velocity 0.47 cm/s    Left Ventricular Outflow Tract Mean Gradient 1.03 mmHg    RV S' 10.03 cm/s    TAPSE 2.54 cm    LA size 3.01 cm    Left Atrium Major Axis 4.68 cm    LA Vol (MOD) 29.41 mL    COREY (MOD) 15.4 mL/m2    RA Major Axis 5.06 cm    AV mean gradient 2.4 mmHg    AV peak gradient 5.8 mmHg    Ao peak pollo 1.2 m/s    Ao VTI 21.0 cm    LVOT peak VTI 15.3 cm    AV valve area 2.3 cm²    AV Velocity Ratio 0.67     AV index (prosthetic) 0.73     FADI by Velocity Ratio 2.1 cm²    MV stenosis pressure 1/2 time 60.82 ms    MV valve area p 1/2 method 3.62 cm2    Triscuspid Valve Regurgitation Peak Gradient 36 mmHg    PV PEAK VELOCITY 0.75 m/s    PV peak gradient 2 mmHg    Pulmonary Valve Mean Velocity 0.46 m/s    Mean e' 0.08 m/s    ZLVIDS -1.61     ZLVIDD -1.34     LA  area A4C 11.55 cm2    LA area A2C 14.09 cm2    RVDD 2.25 cm    TV resting pulmonary artery pressure 39 mmHg    RV TB RVSP 6 mmHg    Est. RA pres 3 mmHg    Narrative      Left Ventricle: The left ventricle is normal in size. Normal wall   thickness. There is normal systolic function with a visually estimated   ejection fraction of 60 - 65%. Grade I diastolic dysfunction.    Right Ventricle: Normal right ventricular cavity size. Systolic   function is normal.    Aortic Valve: The aortic valve is a trileaflet valve. There is aortic   valve sclerosis.    Tricuspid Valve: There is moderate regurgitation.    Pulmonary Artery: The estimated pulmonary artery systolic pressure is   39 mmHg.     , and X-Ray: CXR: X-Ray Chest 1 View (CXR): No results found for this visit on 11/05/24. and X-Ray Chest PA and Lateral (CXR): No results found for this visit on 11/05/24. and KUB: X-Ray Abdomen AP 1 View (KUB): No results found for this visit on 11/05/24.  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] 11/05/2024 Yes      Problems Resolved During this Admission:       VTE Risk Mitigation (From admission, onward)           Ordered     enoxaparin injection 70 mg  Every 12 hours         11/09/24 1934     IP VTE HIGH RISK PATIENT  Once         11/05/24 1413     Place sequential compression device  Until discontinued         11/05/24 1413                  Current Facility-Administered Medications   Medication    amiodarone tablet 400 mg    docusate sodium capsule 100 mg    enoxaparin injection 70 mg    HYDROcodone-acetaminophen 7.5-325 mg per tablet 1 tablet    melatonin tablet 6 mg    metoprolol tartrate (LOPRESSOR) tablet 50 mg    nicotine 21 mg/24 hr  1 patch    ondansetron injection 4 mg    pantoprazole EC tablet 40 mg    traMADoL tablet 50 mg      Echocardiogram 11/2024: LVEF 60%-65%, Grade 1 LVDD, 2+ TR. PASP 39 mmHg        DX: New onset afib w/ RVR s/p successful TEECV yesterday, remains in SR  SVT  SBO, s/p hemicolectomy last wk 11/24, now almost ready for DC home  Neoplasm of cecum   LVEF 60% 11/24        Plan: May DC home on amiodarone 200 mg bid, metoprolol 50 bid, apixaban 5 mg bid  Follow in clinic next wk    Transcribed by  Valentino Acuna NP for Dr MERVIN Russo  Cardiology  [place] - [facility] (3rd Fl)  I attest that I have personally seen and examined this patient. I have reviewed and discussed the management in detail as outlined above.

## 2024-11-13 NOTE — NURSING
D/c instructions reviewed with pt & spouse. Pt brought down to vehicle via wheel chair with nurse assist

## 2024-11-14 NOTE — PLAN OF CARE
[place] - Med Surg (3rd Fl)  Discharge Final Note    Primary Care Provider: Mychal Valderrama MD    Expected Discharge Date: 11/13/2024    Final Discharge Note (most recent)       Final Note - 11/14/24 1418          Final Note    Assessment Type Final Discharge Note (P)      Anticipated Discharge Disposition Home or Self Care (P)      Hospital Resources/Appts/Education Provided Appointments scheduled and added to AVS;Provided education on problems/symptoms using teachback;Provided patient/caregiver with written discharge plan information (P)         Post-Acute Status    Discharge Delays None known at this time (P)                      Important Message from Medicare             Contact Info       Sean Russo MD   Specialty: Cardiology    [address] [place] [address]  [place] [address]   Phone: [phone]       Next Steps: Follow up on 11/21/2024    Instructions: At 1pm    Mychal Valderrama MD   Specialty: Family Medicine    [place] [facility]  [address]  [place] [place]  [place] [address]   Phone: [phone]       Next Steps: Follow up on 11/20/2024    Instructions: hospital follow up @ [facility]    Connor Lopez Jr., MD   Specialty: General Surgery    [address]  [address]  [facility] [address]   Phone: [phone]       Next Steps: Schedule an appointment as soon as possible for a visit in 1 week(s)    Instructions: For wound re-check          Patient will discharge home today. No post acute needs. Follow up appointments scheduled above.

## 2024-11-19 ENCOUNTER — OFFICE VISIT (OUTPATIENT)
Dept: SURGERY | Facility: CLINIC | Age: 74
End: 2024-11-19
Payer: MEDICARE

## 2024-11-19 DIAGNOSIS — D49.0 NEOPLASM OF CECUM: Primary | ICD-10-CM

## 2024-11-19 PROCEDURE — 99499 UNLISTED E&M SERVICE: CPT | Mod: S$GLB,,, | Performed by: SURGERY

## 2024-11-19 PROCEDURE — 99999 PR PBB SHADOW E&M-EST. PATIENT-LVL II: CPT | Mod: PBBFAC,,, | Performed by: SURGERY

## 2024-11-19 NOTE — PROGRESS NOTES
Patient is postop right hemicolectomy for small-bowel obstruction.  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  He is 2 weeks postop.  He is doing great.  He really has no complaints.  His staples were removed today.  Steri-Strips were placed.  No redness or drainage.  Patient would like to follow up with [facility].  I will make the referral.

## 2025-03-18 DIAGNOSIS — I48.91 ATRIAL FIBRILLATION WITH RAPID VENTRICULAR RESPONSE: Primary | ICD-10-CM

## 2025-06-13 NOTE — PROGRESS NOTES
[facility] ([facility])  [facility] Medicine  Progress Note    Patient Name: Abdifatah Steele  MRN: [id number]  Patient Class: IP- Inpatient   Admission Date: 11/5/2024  Length of Stay: 6 days  Attending Physician: Connor Lopez Jr., *  Primary Care Provider: Mychal Valderrama MD        Subjective:     Principal Problem:Small bowel obstruction        HPI:  Medicine was consulted by surgery to see this 74-year-old white male status post hemicolectomy he has been having runs of V-tach.  These are all asymptomatic.  Patient denies chest pain, shortness of breath.  He still has an NG tube in place.    Overview/Hospital Course:  Medicine consulted earlier this admission for arrythmia. Given Mg and had no further episodes.  Medicine team called again evening of 11/9/24 for new onset a-fib RVR. HR 120s on average. EKG confirming a-fib. Labs OK and no acute electrolytes abnormalities. TSH normal.  Cardiology had evalauted patient prior. TTE with diastolic dysfunction but no valve abnormalities and EF normal. Their plan was for stress testing outpatient.    11/9/24 patient treated with IV metoprolol 5mg x2 doses and PO metoprolol 50mg PO. His HR did improved to 70-80s and remains normal throughout the evening though still in a-fib. Transitioned to treatment dose lovenox.    Today, patient will remain on telemetry on PO metoprolol. Will ask CM to assist with Eliquis pricing for outpatient use. If HR is controlled can discharge later today or tomorrow AM on metoprolol 50mg PO BID and Eliquis 5mg BID. Happy to write these two medication on discharge for surgery team if needed.  He will need close PCP and cardiology follow up later this week.     11/11 PT HD stable on room air.  Went into afib with RVR.  Received IV metoprolol.  Current HR 90's.  On lovenox and metoprolol.  Waiting for cardiology recommendations.    Replace potassium.      Past Medical History:   Diagnosis Date    Fractures        Past Surgical History:  Nursing home or Independent living name: [facility]  If its not nursing home or Independent living, do they see PCP in Network:  Who is calling: Alejandra  Callback number: [phone]  Symptoms: New Admission, Does not have Warfarin 1.5 mg - we don't have it in facility.   Has 5 mg only, they can split the medicine or wait for pharmacy to open up or what should they do  Did you page oncall or place in triage hub: Yes, via Esc - Ashley Anglin     Procedure Laterality Date    COLON RESECTION Right 11/5/2024    Procedure: COLON RESECTION;  Surgeon: Connor Lopez Jr., MD;  Location: [facility] OR;  Service: General;  Laterality: Right;    DIAGNOSTIC LAPAROSCOPY N/A 11/5/2024    Procedure: LAPAROSCOPY, DIAGNOSTIC (ATTEMPTED);  Surgeon: Connor Lopez Jr., MD;  Location: [facility] OR;  Service: General;  Laterality: N/A;    HAND SURGERY      LAPAROTOMY, EXPLORATORY N/A 11/5/2024    Procedure: LAPAROTOMY, EXPLORATORY;  Surgeon: Connor Lopez Jr., MD;  Location: [facility] OR;  Service: General;  Laterality: N/A;    SKULL FRACTURE ELEVATION         Review of patient's allergies indicates:  No Known Allergies    No current facility-administered medications on file prior to encounter.     Current Outpatient Medications on File Prior to Encounter   Medication Sig    carbamazepine (TEGRETOL XR) 100 MG 12 hr tablet Take 100 mg by mouth 2 (two) times daily.      CRESTOR 10 mg tablet      Family History    None       Tobacco Use    Smoking status: Some Days     Current packs/day: 0.50     Types: Cigarettes    Smokeless tobacco: Not on file   Substance and Sexual Activity    Alcohol use: No    Drug use: No    Sexual activity: Not on file     Review of Systems   Constitutional:  Negative for fatigue and fever.   HENT:  Negative for congestion, ear pain, hearing loss, rhinorrhea and sore throat.    Eyes:  Negative for redness and visual disturbance.   Respiratory:  Negative for cough, shortness of breath and wheezing.    Cardiovascular:  Negative for chest pain, palpitations and leg swelling.   Gastrointestinal:  Negative for constipation, diarrhea, nausea and vomiting.   Musculoskeletal:  Negative for back pain, joint swelling and neck pain.   Neurological:  Negative for dizziness, light-headedness and headaches.     Objective:     Vital Signs (Most Recent):  Temp: 98.8 °F (37.1 °C) (11/11/24 0416)  Pulse: 101 (11/11/24 1029)  Resp: 18 (11/11/24 1029)  BP: 136/73 (11/11/24  1029)  SpO2: (!) 94 % (11/11/24 1029) Vital Signs (24h Range):  Temp:  [97.8 °F (36.6 °C)-98.8 °F (37.1 °C)] 98.8 °F (37.1 °C)  Pulse:  [] 101  Resp:  [18-20] 18  SpO2:  [91 %-96 %] 94 %  BP: (108-139)/(64-83) 136/73     Weight: 74 kg (163 lb 2.3 oz)  Body mass index is 23.41 kg/m².     Physical Exam  Vitals and nursing note reviewed.   Constitutional:       General: He is not in acute distress.     Appearance: He is well-developed.   HENT:      Head: Normocephalic and atraumatic.      Right Ear: External ear normal.      Left Ear: External ear normal.   Eyes:      General:         Right eye: No discharge.         Left eye: No discharge.   Neck:      Thyroid: No thyromegaly.   Cardiovascular:      Rate and Rhythm: Normal rate. Rhythm irregular.      Heart sounds: No murmur heard.  Pulmonary:      Effort: Pulmonary effort is normal. No respiratory distress.      Breath sounds: Normal breath sounds.   Abdominal:      General: There is no distension.      Tenderness: There is no abdominal tenderness.   Skin:     General: Skin is warm and dry.   Neurological:      Mental Status: He is alert and oriented to person, place, and time.   Psychiatric:         Behavior: Behavior normal.         Thought Content: Thought content normal.          Significant Labs: All pertinent labs within the past 24 hours have been reviewed.  CBC:   Recent Labs   Lab 11/09/24 1923 11/11/24 0447   WBC 6.55 7.28   HGB 14.1 14.8   HCT 41.0 43.3    267     CMP:   Recent Labs   Lab 11/09/24 1923 11/11/24 0447    142   K 3.6 3.4*    106   CO2 25 24    103   BUN 7* 12   CREATININE 0.6 0.7   CALCIUM 8.4* 8.2*   PROT 6.0  --    ALBUMIN 2.6*  --    BILITOT 0.4  --    ALKPHOS 60  --    AST 11  --    ALT 10  --    ANIONGAP 11 12     Recent Lab Results         11/11/24 0447        Anion Gap 12       Baso # 0.02       Basophil % 0.3       BUN 12       Calcium 8.2       Chloride 106       CO2 24       Creatinine 0.7        Differential Method Automated       eGFR >60       Eos # 0.3       Eos % 3.8       Glucose 103       Gran # (ANC) 5.3       Gran % 72.1       Hematocrit 43.3       Hemoglobin 14.8       Immature Grans (Abs) 0.03  Comment: Mild elevation in immature granulocytes is non specific and   can be seen in a variety of conditions including stress response,   acute inflammation, trauma and pregnancy. Correlation with other   laboratory and clinical findings is essential.         Immature Granulocytes 0.4       Lymph # 1.0       Lymph % 13.6       Magnesium  1.9       MCH 32.2       MCHC 34.2       MCV 94       Mono # 0.7       Mono % 9.8       MPV 9.5       nRBC 0       Platelet Count 267       Potassium 3.4       RBC 4.60       RDW 13.5       Sodium 142       WBC 7.28               Significant Imaging: I have reviewed all pertinent imaging results/findings within the past 24 hours.    Assessment/Plan:      * Small bowel obstruction  Management per surgery team      Electrolyte abnormality  Potassium 3.4   Po potassium       Atrial fibrillation with rapid ventricular response  Patient has paroxysmal (<7 days) atrial fibrillation. Patient is currently in atrial fibrillation. [id number] Score: 2. The patients heart rate in the last 24 hours is as follows:  Pulse  Min: 71  Max: 113     Antiarrhythmics  metoprolol tartrate (LOPRESSOR) tablet 50 mg, 2 times daily, Oral    Anticoagulants  enoxaparin injection 70 mg, Every 12 hours, Subcutaneous  apixaban tablet, 2 times daily, Oral    Plan  - Replete lytes with a goal of K>4, Mg >2  - Patient is anticoagulated, see medications listed above.  - Patient's afib is currently controlled    Cardiology recommendations pending     Smoker  Smoking cessation recommended      Supraventricular tachycardia  Patient has had several runs of ventricular tachycardia.  His magnesium level was 1.8 so I gave him 2 g of Mag and he has not had any runs of V-tach since.  Continue to monitor on telemetry.     Cardiology consulted    11/10: Medicine reconsulted overnight. Patient had no further SVT on telemetry since initial consult  until last night. A-fib RVR noted with HR sustaining 120s. Treated with IV metoprolol and now on PO metoprolol  HR 70-80s  Remains in a-fib  Treatment dose lovenox started  Will plan to transition to PO Eliquis. CM to assist with pricing to ensure able to obtain at discharge.    If HR remains stable, patient could discharge with metoprolol and eliquis and see cardiology later this week for repeat EKG and discuss antiarrythmic therapy. If remains inpatient overnight cardiology to reassess in AM.   TTE and labs are normal      Neoplasm of cecum  Management per surgery team        VTE Risk Mitigation (From admission, onward)           Ordered     enoxaparin injection 70 mg  Every 12 hours         11/09/24 1934     IP VTE HIGH RISK PATIENT  Once         11/05/24 1413     Place sequential compression device  Until discontinued         11/05/24 1413                    Discharge Planning   ANAMIKA: 11/5/2024     Code Status: Not on file   Is the patient medically ready for discharge?:     Reason for patient still in hospital (select all that apply): Patient trending condition  Discharge Plan A: Home                  Simona Ballard PA-C  Department of Hospital Medicine   [place] [facility] Surg (3rd Fl)

## (undated) DEVICE — APPLICATOR CHLORAPREP ORN 26ML

## (undated) DEVICE — NDL ECLIPSE SAF REG 25GX1.5IN

## (undated) DEVICE — STAPLER INT PROX TX 60X3.5MM

## (undated) DEVICE — CART STAPLE RELD PROX 60X4.8MM

## (undated) DEVICE — TUBING LAPARSCOPIC INSUFFLATIN

## (undated) DEVICE — VOYANT FINE FUSION DEVICE

## (undated) DEVICE — NDL ECLIPSE SAFETY 18GX1-1/2IN

## (undated) DEVICE — GLOVE 8 PROTEXIS PI BLUE

## (undated) DEVICE — CUTTER PROXIMATE BLUE 75MM

## (undated) DEVICE — SUT 0 36IN PDS II VIO MONO

## (undated) DEVICE — CANNULA LAP SEAL Z THRD 5X100

## (undated) DEVICE — DRAPE LAPSCP CHOLE 122X102X78

## (undated) DEVICE — ELECTRODE RESUSCITATION 1 STEP

## (undated) DEVICE — SUT SA85H SILK 2-0

## (undated) DEVICE — STAPLER SKIN ROTATING HEAD

## (undated) DEVICE — RELOAD PROXIMATE CUT BLUE 75MM

## (undated) DEVICE — Device

## (undated) DEVICE — ADHESIVE DERMABOND ADVANCED

## (undated) DEVICE — TROCAR ENDO Z THREAD KII 5X100

## (undated) DEVICE — DRESSING COVER AQUACEL AG SURG

## (undated) DEVICE — NDL INSUF ULTRA VERESS 120MM

## (undated) DEVICE — ELECTRODE REM PLYHSV RETURN 9

## (undated) DEVICE — SUT VICRYL 3-0 27 SH